# Patient Record
Sex: MALE | NOT HISPANIC OR LATINO
[De-identification: names, ages, dates, MRNs, and addresses within clinical notes are randomized per-mention and may not be internally consistent; named-entity substitution may affect disease eponyms.]

---

## 2019-10-10 ENCOUNTER — APPOINTMENT (OUTPATIENT)
Dept: ORTHOPEDIC SURGERY | Facility: CLINIC | Age: 54
End: 2019-10-10
Payer: COMMERCIAL

## 2019-10-10 VITALS — HEART RATE: 70 BPM | SYSTOLIC BLOOD PRESSURE: 170 MMHG | DIASTOLIC BLOOD PRESSURE: 103 MMHG

## 2019-10-10 PROCEDURE — 99203 OFFICE O/P NEW LOW 30 MIN: CPT

## 2019-10-10 PROCEDURE — 73630 X-RAY EXAM OF FOOT: CPT | Mod: LT

## 2019-10-17 ENCOUNTER — EMERGENCY (EMERGENCY)
Facility: HOSPITAL | Age: 54
LOS: 0 days | Discharge: HOME | End: 2019-10-17
Attending: EMERGENCY MEDICINE
Payer: COMMERCIAL

## 2019-10-17 ENCOUNTER — APPOINTMENT (OUTPATIENT)
Dept: INTERNAL MEDICINE | Facility: CLINIC | Age: 54
End: 2019-10-17
Payer: COMMERCIAL

## 2019-10-17 ENCOUNTER — APPOINTMENT (OUTPATIENT)
Dept: ORTHOPEDIC SURGERY | Facility: CLINIC | Age: 54
End: 2019-10-17
Payer: COMMERCIAL

## 2019-10-17 VITALS
OXYGEN SATURATION: 98 % | BODY MASS INDEX: 35.55 KG/M2 | TEMPERATURE: 98.3 F | RESPIRATION RATE: 18 BRPM | HEIGHT: 69 IN | WEIGHT: 240 LBS | SYSTOLIC BLOOD PRESSURE: 180 MMHG | HEART RATE: 86 BPM | DIASTOLIC BLOOD PRESSURE: 100 MMHG

## 2019-10-17 VITALS
WEIGHT: 195.11 LBS | HEART RATE: 86 BPM | SYSTOLIC BLOOD PRESSURE: 173 MMHG | OXYGEN SATURATION: 98 % | TEMPERATURE: 99 F | DIASTOLIC BLOOD PRESSURE: 102 MMHG | RESPIRATION RATE: 16 BRPM

## 2019-10-17 VITALS
HEART RATE: 75 BPM | RESPIRATION RATE: 16 BRPM | SYSTOLIC BLOOD PRESSURE: 152 MMHG | DIASTOLIC BLOOD PRESSURE: 86 MMHG | OXYGEN SATURATION: 97 %

## 2019-10-17 VITALS — TEMPERATURE: 98.6 F | SYSTOLIC BLOOD PRESSURE: 208 MMHG | DIASTOLIC BLOOD PRESSURE: 100 MMHG

## 2019-10-17 DIAGNOSIS — Z72.3 LACK OF PHYSICAL EXERCISE: ICD-10-CM

## 2019-10-17 DIAGNOSIS — Z78.9 OTHER SPECIFIED HEALTH STATUS: ICD-10-CM

## 2019-10-17 DIAGNOSIS — I10 ESSENTIAL (PRIMARY) HYPERTENSION: ICD-10-CM

## 2019-10-17 DIAGNOSIS — R07.9 CHEST PAIN, UNSPECIFIED: ICD-10-CM

## 2019-10-17 DIAGNOSIS — Z80.42 FAMILY HISTORY OF MALIGNANT NEOPLASM OF PROSTATE: ICD-10-CM

## 2019-10-17 DIAGNOSIS — R07.89 OTHER CHEST PAIN: ICD-10-CM

## 2019-10-17 DIAGNOSIS — Z80.8 FAMILY HISTORY OF MALIGNANT NEOPLASM OF OTHER ORGANS OR SYSTEMS: ICD-10-CM

## 2019-10-17 LAB
ALBUMIN SERPL ELPH-MCNC: 3.9 G/DL — SIGNIFICANT CHANGE UP (ref 3.3–5)
ALP SERPL-CCNC: 60 U/L — SIGNIFICANT CHANGE UP (ref 40–120)
ALT FLD-CCNC: 32 U/L — SIGNIFICANT CHANGE UP (ref 12–78)
ANION GAP SERPL CALC-SCNC: 7 MMOL/L — SIGNIFICANT CHANGE UP (ref 5–17)
APTT BLD: 31.9 SEC — SIGNIFICANT CHANGE UP (ref 27.5–36.3)
AST SERPL-CCNC: 20 U/L — SIGNIFICANT CHANGE UP (ref 15–37)
BASOPHILS # BLD AUTO: 0.04 K/UL — SIGNIFICANT CHANGE UP (ref 0–0.2)
BASOPHILS NFR BLD AUTO: 0.4 % — SIGNIFICANT CHANGE UP (ref 0–2)
BILIRUB SERPL-MCNC: 0.5 MG/DL — SIGNIFICANT CHANGE UP (ref 0.2–1.2)
BUN SERPL-MCNC: 15 MG/DL — SIGNIFICANT CHANGE UP (ref 7–23)
CALCIUM SERPL-MCNC: 9.1 MG/DL — SIGNIFICANT CHANGE UP (ref 8.5–10.1)
CHLORIDE SERPL-SCNC: 108 MMOL/L — SIGNIFICANT CHANGE UP (ref 96–108)
CO2 SERPL-SCNC: 27 MMOL/L — SIGNIFICANT CHANGE UP (ref 22–31)
CREAT SERPL-MCNC: 1.05 MG/DL — SIGNIFICANT CHANGE UP (ref 0.5–1.3)
EOSINOPHIL # BLD AUTO: 0.23 K/UL — SIGNIFICANT CHANGE UP (ref 0–0.5)
EOSINOPHIL NFR BLD AUTO: 2.2 % — SIGNIFICANT CHANGE UP (ref 0–6)
GLUCOSE SERPL-MCNC: 105 MG/DL — HIGH (ref 70–99)
HCT VFR BLD CALC: 45.3 % — SIGNIFICANT CHANGE UP (ref 39–50)
HGB BLD-MCNC: 15.7 G/DL — SIGNIFICANT CHANGE UP (ref 13–17)
IMM GRANULOCYTES NFR BLD AUTO: 0.3 % — SIGNIFICANT CHANGE UP (ref 0–1.5)
INR BLD: 1.09 RATIO — SIGNIFICANT CHANGE UP (ref 0.88–1.16)
LYMPHOCYTES # BLD AUTO: 2.3 K/UL — SIGNIFICANT CHANGE UP (ref 1–3.3)
LYMPHOCYTES # BLD AUTO: 21.6 % — SIGNIFICANT CHANGE UP (ref 13–44)
MCHC RBC-ENTMCNC: 30.3 PG — SIGNIFICANT CHANGE UP (ref 27–34)
MCHC RBC-ENTMCNC: 34.7 GM/DL — SIGNIFICANT CHANGE UP (ref 32–36)
MCV RBC AUTO: 87.3 FL — SIGNIFICANT CHANGE UP (ref 80–100)
MONOCYTES # BLD AUTO: 0.6 K/UL — SIGNIFICANT CHANGE UP (ref 0–0.9)
MONOCYTES NFR BLD AUTO: 5.6 % — SIGNIFICANT CHANGE UP (ref 2–14)
NEUTROPHILS # BLD AUTO: 7.43 K/UL — HIGH (ref 1.8–7.4)
NEUTROPHILS NFR BLD AUTO: 69.9 % — SIGNIFICANT CHANGE UP (ref 43–77)
PLATELET # BLD AUTO: 188 K/UL — SIGNIFICANT CHANGE UP (ref 150–400)
POTASSIUM SERPL-MCNC: 4.1 MMOL/L — SIGNIFICANT CHANGE UP (ref 3.5–5.3)
POTASSIUM SERPL-SCNC: 4.1 MMOL/L — SIGNIFICANT CHANGE UP (ref 3.5–5.3)
PROT SERPL-MCNC: 7.5 GM/DL — SIGNIFICANT CHANGE UP (ref 6–8.3)
PROTHROM AB SERPL-ACNC: 12.1 SEC — SIGNIFICANT CHANGE UP (ref 10–12.9)
RBC # BLD: 5.19 M/UL — SIGNIFICANT CHANGE UP (ref 4.2–5.8)
RBC # FLD: 11.8 % — SIGNIFICANT CHANGE UP (ref 10.3–14.5)
SODIUM SERPL-SCNC: 142 MMOL/L — SIGNIFICANT CHANGE UP (ref 135–145)
TROPONIN I SERPL-MCNC: <0.015 NG/ML — SIGNIFICANT CHANGE UP (ref 0.01–0.04)
WBC # BLD: 10.63 K/UL — HIGH (ref 3.8–10.5)
WBC # FLD AUTO: 10.63 K/UL — HIGH (ref 3.8–10.5)

## 2019-10-17 PROCEDURE — 99214 OFFICE O/P EST MOD 30 MIN: CPT | Mod: 57

## 2019-10-17 PROCEDURE — 36415 COLL VENOUS BLD VENIPUNCTURE: CPT

## 2019-10-17 PROCEDURE — 85025 COMPLETE CBC W/AUTO DIFF WBC: CPT

## 2019-10-17 PROCEDURE — 93010 ELECTROCARDIOGRAM REPORT: CPT

## 2019-10-17 PROCEDURE — 99284 EMERGENCY DEPT VISIT MOD MDM: CPT | Mod: 25

## 2019-10-17 PROCEDURE — 99285 EMERGENCY DEPT VISIT HI MDM: CPT

## 2019-10-17 PROCEDURE — 28470 CLTX METATARSAL FX WO MNP EA: CPT | Mod: LT

## 2019-10-17 PROCEDURE — 85730 THROMBOPLASTIN TIME PARTIAL: CPT

## 2019-10-17 PROCEDURE — 93005 ELECTROCARDIOGRAM TRACING: CPT

## 2019-10-17 PROCEDURE — 85610 PROTHROMBIN TIME: CPT

## 2019-10-17 PROCEDURE — 71045 X-RAY EXAM CHEST 1 VIEW: CPT

## 2019-10-17 PROCEDURE — 71045 X-RAY EXAM CHEST 1 VIEW: CPT | Mod: 26

## 2019-10-17 PROCEDURE — 73620 X-RAY EXAM OF FOOT: CPT | Mod: LT

## 2019-10-17 PROCEDURE — 99205 OFFICE O/P NEW HI 60 MIN: CPT

## 2019-10-17 PROCEDURE — 80053 COMPREHEN METABOLIC PANEL: CPT

## 2019-10-17 PROCEDURE — 84484 ASSAY OF TROPONIN QUANT: CPT

## 2019-10-17 RX ORDER — MULTIVIT-MIN/FOLIC/VIT K/LYCOP 400-300MCG
TABLET ORAL
Refills: 0 | Status: ACTIVE | COMMUNITY

## 2019-10-17 RX ORDER — METOPROLOL TARTRATE 50 MG
50 TABLET ORAL ONCE
Refills: 0 | Status: COMPLETED | OUTPATIENT
Start: 2019-10-17 | End: 2019-10-17

## 2019-10-17 RX ORDER — METOPROLOL TARTRATE 50 MG
1 TABLET ORAL
Qty: 30 | Refills: 0
Start: 2019-10-17 | End: 2019-11-15

## 2019-10-17 RX ADMIN — Medication 50 MILLIGRAM(S): at 17:20

## 2019-10-17 NOTE — CONSULT NOTE ADULT - PROBLEM SELECTOR RECOMMENDATION 9
Atypical symptoms for angina; recommend outpatient nuclear stress test if serial assays of serum troponin are normal; ECG has a nonischemic appearance.

## 2019-10-17 NOTE — ED PROVIDER NOTE - CARE PROVIDERS DIRECT ADDRESSES
[email or web address]

## 2019-10-17 NOTE — ED ADULT NURSE NOTE - CHPI ED NUR SYMPTOMS NEG
no back pain/no nausea/no congestion/no fever/no dizziness/no syncope/no shortness of breath/no vomiting/no diaphoresis/no chills

## 2019-10-17 NOTE — HISTORY OF PRESENT ILLNESS
[FreeTextEntry8] : Mr. Partida is a 53-year-old male who presents for acute evaluation. He was referred from Dr. James Mcpherson's office for evaluation of hypertension. Mr. Partida was found to have a blood pressure of 220/100 in Dr. Mcpherson's office. He was being evaluated for a left foot injury. Apparently, the patient was at a podiatrist's office last week and was found to have a blood pressure at that time of 170/90 but was not advised to have any medical followup. While in the waiting room in this office the patient states that he had approximately 10 minutes of midsternal discomfort with radiation to left arm. Has no previous history of coronary artery disease or chest pain. Blood pressure is 180/100. Mr. Partida does have a history of hypertension but last saw a primary care doctor 5 years ago and has not been on antihypertensive medication. He denies any auditory or visual disturbances.

## 2019-10-17 NOTE — CONSULT NOTE ADULT - SUBJECTIVE AND OBJECTIVE BOX
CHIEF COMPLAINT: Patient is a 53y old  Male who presents with a chief complaint of HTN and chest discomfort     HPI: Mark Partida is a 53 year old man with a history of HTN who was sent to the ED by Dr. Monsivais for the evaluation of chest discomfort and hypertension.  Mr Partida has a history of HTN but discontinued antihypertensive therapy ~ 5 years ago; he has not had any medical attention in ~ 5 years but previously had been seeing Dr. Geoffrey Odell in [place].  He has no history of heart disease.  His BP was found to be markedly elevated on recent visits to his podiatrist and orthopedic surgeon.  He denies headache, dyspnea; describes infrequent and mild chest discomfort that occurs sporadically and not associated with exertion - he is unable to identify exacerbating or alleviating factors.  He works at [employer] and walks ~ 20,000 steps/day with no baseline angina.  He admits to a suboptimal diet.  He presently feels well but describes anxiety.    PAST MEDICAL & SURGICAL HISTORY:  As above in HPI  Recent metatarsal fracture    SOCIAL HISTORY:   Alcohol: Rare consumption  Smoking: Nonsmoker    FAMILY HISTORY: Father with CAD and s/p CABG (not premature); multiple family members with HTN    HOME MEDICATIONS: None    Allergies:  No Known Allergies    REVIEW OF SYSTEMS:  CONSTITUTIONAL: No weakness, fevers or chills  Eyes: No visual changes  NECK: No pain or stiffness  RESPIRATORY: No cough, wheezing, hemoptysis; No shortness of breath  CARDIOVASCULAR: + chest pain (SEE HPI); no palpitations  GASTROINTESTINAL: No abdominal pain. No nausea, vomiting, or hematemesis; No diarrhea or constipation. No melena or hematochezia.  GENITOURINARY: No dysuria, frequency or hematuria  NEUROLOGICAL: No numbness.  PSYCH: + Anxiety  SKIN: No itching or rash  All other review of systems is negative unless indicated above    VITAL SIGNS:   Vital Signs Last 24 Hrs  T(C): 37.1 (17 Oct 2019 14:15), Max: 37.1 (17 Oct 2019 14:15)  T(F): 98.7 (17 Oct 2019 14:15), Max: 98.7 (17 Oct 2019 14:15)  HR: 84 (17 Oct 2019 15:33) (84 - 86)  BP: 155/93 (17 Oct 2019 15:33) (155/93 - 173/102)  RR: 16 (17 Oct 2019 15:33) (16 - 16)  SpO2: 97% (17 Oct 2019 15:33) (97% - 98%)    PHYSICAL EXAM:  Constitutional: NAD, awake and alert, overweight  HEENT:  EOMI,  Pupils round, No oral cyanosis.  Pulmonary: Non-labored, breath sounds are clear bilaterally, No wheezing, rales or rhonchi  Cardiovascular: S1 and S2, regular rate and rhythm, no Murmur  Gastrointestinal: Bowel Sounds present, soft, nontender.   Lymph: No peripheral edema. No cervical lymphadenopathy.  Neurological: Alert, no focal deficits  Skin: No rashes.  Psych:  Mood & affect appropriate    LABS:                     15.7   10.63 )-----------( 188      ( 17 Oct 2019 14:18 )             45.3     142    |  108    |  15     ----------------------------<  105    4.1     |  27     |  1.05     ECG: Normal sinus rhythm, LVH

## 2019-10-17 NOTE — ED ADULT NURSE NOTE - OBJECTIVE STATEMENT
pt sent from ortho dr for htn and angina. pt was getting his left foot re checked after toe fracture and bp was elevated and stated he had some slight midline chest pain that radiated to left shoulder but unsure if muscle or not. pt was given 162 of aspirin and IV placed LAC 20G by ems. pt currently denies any chest pain or sob. bp 170s currently. attached to cardiac monitor and ekg completed given to dr flores.

## 2019-10-17 NOTE — ED PROVIDER NOTE - CARE PROVIDER_API CALL
Asad See)  Cardiovascular Disease; Internal Medicine  [address] [place] [address]  Phone: [phone]  Fax: [phone]  Follow Up Time:

## 2019-10-17 NOTE — PHYSICAL EXAM
[No Acute Distress] : no acute distress [Well Nourished] : well nourished [Well-Appearing] : well-appearing [Normal Sclera/Conjunctiva] : normal sclera/conjunctiva [Well Developed] : well developed [PERRL] : pupils equal round and reactive to light [EOMI] : extraocular movements intact [Normal Oropharynx] : the oropharynx was normal [Normal Outer Ear/Nose] : the outer ears and nose were normal in appearance [No Lymphadenopathy] : no lymphadenopathy [No JVD] : no jugular venous distention [Supple] : supple [Thyroid Normal, No Nodules] : the thyroid was normal and there were no nodules present [No Respiratory Distress] : no respiratory distress  [No Accessory Muscle Use] : no accessory muscle use [Normal Rate] : normal rate  [Clear to Auscultation] : lungs were clear to auscultation bilaterally [Regular Rhythm] : with a regular rhythm [Normal S1, S2] : normal S1 and S2 [No Carotid Bruits] : no carotid bruits [No Murmur] : no murmur heard [No Abdominal Bruit] : a ~M bruit was not heard ~T in the abdomen [No Varicosities] : no varicosities [No Palpable Aorta] : no palpable aorta [No Edema] : there was no peripheral edema [Pedal Pulses Present] : the pedal pulses are present [No Extremity Clubbing/Cyanosis] : no extremity clubbing/cyanosis [Non Tender] : non-tender [Non-distended] : non-distended [Soft] : abdomen soft [No HSM] : no HSM [No Masses] : no abdominal mass palpated [Normal Anterior Cervical Nodes] : no anterior cervical lymphadenopathy [Normal Posterior Cervical Nodes] : no posterior cervical lymphadenopathy [Normal Bowel Sounds] : normal bowel sounds [No CVA Tenderness] : no CVA  tenderness [No Spinal Tenderness] : no spinal tenderness [Grossly Normal Strength/Tone] : grossly normal strength/tone [No Joint Swelling] : no joint swelling [No Rash] : no rash [Coordination Grossly Intact] : coordination grossly intact [No Focal Deficits] : no focal deficits [Deep Tendon Reflexes (DTR)] : deep tendon reflexes were 2+ and symmetric [Normal Gait] : normal gait [Normal Affect] : the affect was normal [Normal Insight/Judgement] : insight and judgment were intact
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
## 2019-10-17 NOTE — ED PROVIDER NOTE - PATIENT PORTAL LINK FT
You can access the FollowMyHealth Patient Portal offered by [facility] by registering at the following website: [email or web address]. By joining [facility]’s FollowMyHealth portal, you will also be able to view your health information using other applications (apps) compatible with our system.

## 2019-10-17 NOTE — PLAN
[FreeTextEntry1] : Mr. Partida is a 53-year-old male who presents for acute evaluation. He has accelerated hypertension with a blood pressure of 180/100. Had previously been 210/100 earlier today. The patient had an episode of substernal chest discomfort lasting approximately 10 minutes with some radiation into left arm. He has no previous history of coronary disease. The patient will be sent on to the hospital emergency room via ambulance for further evaluation and treatment. I have notified Dr. Asad See full cardiology evaluation.

## 2019-10-17 NOTE — CONSULT NOTE ADULT - PROBLEM SELECTOR RECOMMENDATION 2
Uncontrolled / untreated (due to nonadherence); poor diet and anxiety likely contributing; would start metoprolol succinate 50mg daily; I offered to see patient in my [place] office next week to titrate Rx and reassess symptoms / schedule appropriate testing.

## 2019-10-17 NOTE — ED ADULT TRIAGE NOTE - CHIEF COMPLAINT QUOTE
pt presents to ED due to HTN pt was seen at his Ortho MD when he was found with HTN and sent to his PMD he developed chest pain while waiting to see PMD pt was given  mg PO by EMS and brought to ED

## 2019-10-17 NOTE — ED PROVIDER NOTE - EKG #1 DATE/TIME
How Severe Is Your Skin Lesion?: mild Has Your Skin Lesion Been Treated?: been treated Is This A New Presentation, Or A Follow-Up?: Skin Lesion 17-Oct-2019 14:27

## 2019-10-17 NOTE — ED ADULT NURSE REASSESSMENT NOTE - NS ED NURSE REASSESS COMMENT FT1
pt dc to home, dc instructions given, pt verbalized understanding of dc. in no acute distress. denies chest pain/sob.

## 2019-10-22 PROBLEM — Z78.9 OTHER SPECIFIED HEALTH STATUS: Chronic | Status: ACTIVE | Noted: 2019-10-17

## 2019-10-23 ENCOUNTER — APPOINTMENT (OUTPATIENT)
Dept: CARDIOLOGY | Facility: CLINIC | Age: 54
End: 2019-10-23
Payer: COMMERCIAL

## 2019-10-23 ENCOUNTER — NON-APPOINTMENT (OUTPATIENT)
Age: 54
End: 2019-10-23

## 2019-10-23 VITALS
HEART RATE: 95 BPM | DIASTOLIC BLOOD PRESSURE: 107 MMHG | RESPIRATION RATE: 16 BRPM | OXYGEN SATURATION: 96 % | BODY MASS INDEX: 35.55 KG/M2 | SYSTOLIC BLOOD PRESSURE: 169 MMHG | WEIGHT: 240 LBS | HEIGHT: 69 IN

## 2019-10-23 VITALS — SYSTOLIC BLOOD PRESSURE: 144 MMHG | DIASTOLIC BLOOD PRESSURE: 84 MMHG

## 2019-10-23 DIAGNOSIS — Z78.9 OTHER SPECIFIED HEALTH STATUS: ICD-10-CM

## 2019-10-23 PROCEDURE — 99215 OFFICE O/P EST HI 40 MIN: CPT

## 2019-10-23 PROCEDURE — 93000 ELECTROCARDIOGRAM COMPLETE: CPT

## 2019-10-23 NOTE — PHYSICAL EXAM
[General Appearance - In No Acute Distress] : no acute distress [Well Groomed] : well groomed [Eyelids - No Xanthelasma] : the eyelids demonstrated no xanthelasmas [No Oral Cyanosis] : no oral cyanosis [Respiration, Rhythm And Depth] : normal respiratory rhythm and effort [Heart Rate And Rhythm] : heart rate and rhythm were normal [Heart Sounds] : normal S1 and S2 [Abdomen Soft] : soft [Bowel Sounds] : normal bowel sounds [Nail Clubbing] : no clubbing of the fingernails [] : no rash [Cyanosis, Localized] : no localized cyanosis [Impaired Insight] : insight and judgment were intact [Oriented To Time, Place, And Person] : oriented to person, place, and time [Mood] : the mood was normal [Affect] : the affect was normal [FreeTextEntry1] : Gait abnormal due to boot on left foot (metatarsal fracture)

## 2019-10-23 NOTE — REVIEW OF SYSTEMS
[see HPI] : see HPI [Chest Pain] : chest pain [Foot Problem] : foot problems [Foot Pain] : foot pain [Negative] : Endocrine [Headache] : no headache [Shortness Of Breath] : no shortness of breath [Palpitations] : no palpitations

## 2019-10-23 NOTE — REASON FOR VISIT
[Follow-Up - From Hospitalization] : follow-up of a recent hospitalization for [Abnormal ECG] : an abnormal ECG [Chest Pain] : chest pain [Hypertension] : hypertension [Medication Management] : Medication management [Other: _____] : [unfilled]

## 2019-10-23 NOTE — HISTORY OF PRESENT ILLNESS
[FreeTextEntry1] : Mark Partida is a 53 year old man with a history of HTN who I met in the [facility] Emergency Department on 10/17/19 after he was referred there by Dr. Monsivais for the evaluation of chest discomfort and hypertension.  Mr Partida has a history of HTN but discontinued antihypertensive therapy ~ 5 years ago; he has not had any medical attention in ~ 5 years but previously had been seeing Dr. Geoffrey Odell in [place].  He has no history of heart disease.  In the ED his BP was moderately elevated and serial assays of troponin were found to be normal -- he was prescribed metoprolol succinate and released after a period of observation.  He has been tolerating metoprolol and denies a recurrence of chest discomfort.  However, he previously experienced mild to moderate substernal chest discomfort radiating towards his left shoulder and arm -- unable to identify exacerbating or alleviating factors.\par

## 2019-10-23 NOTE — DISCUSSION/SUMMARY
[___ Month(s)] : [unfilled] month(s) [With Me] : with me [FreeTextEntry1] : \par Accelerated hypertension: Blood pressure has responded well to initiation of metoprolol succinate ER 50 mg daily; we discussed the importance of a concomitant lifestyle modification (reduction of dietary intake of sodium, weight loss).\par \par Chest pain: Mr. Partida described atypical angina and has several risk factors for heart disease (including hypertension [untreated for the past 5+ years], obesity, and family history of coronary artery disease); his present left foot (metatarsal) fracture precludes exercise stress -- I recommended that he return for pharmacologic stress testing with myocardial perfusion imaging.\par \par Abnormal ECG: Electrocardiogram is consistent with LVH, likely related to previously untreated hypertension; I recommend echocardiography to assess cardiac size and function.\par \par Obesity: We discussed the importance of weight loss; specifically diet, calorie and sodium restriction; exercise when feasible from an orthopedic standpoint.

## 2019-10-29 NOTE — HISTORY OF PRESENT ILLNESS
[de-identified] : The patient comes in today for his left foot.  He states he is not sure exactly how he injured himself.  He was seen at an urgent care center.  He had x-rays taken and comes here for evaluation.  The patient states the pain is constant.   [] : No

## 2019-10-29 NOTE — ADDENDUM
[FreeTextEntry1] : This note was written by Tonja Batista on 10/28/2019 acting as a scribe for NICHOLAS AVILES

## 2019-10-29 NOTE — DISCUSSION/SUMMARY
From: Jony Ochoa  To: Saul Akers MD  Sent: 7/1/2017 11:10 AM CDT  Subject: Atorvastatin Prescription    Dr. Oswald North.    I have 18 atorvastatin pills remaining. I will run out a few days before my scheduled appointment on 7/21. I have no refills left.    Would you renew my prescription before my appointment, or should I try to reschedule the appointment for an earlier date.    Regards,  + Jony   [de-identified] : At this time, due to a fracture of the proximal left fifth metatarsal, I recommended a CAM boot, weightbearing as tolerated.  He will be reassessed in two weeks.  Of note, because of his elevated blood pressure, I will refer him to his primary care.  \par \par I declare responsibility and liability for caring for this fracture for the next 90 days.\par \par \par

## 2019-10-29 NOTE — PHYSICAL EXAM
[de-identified] : Right Foot: \par Range of Motion in Degrees:\par 	                                Claimant:	                Normal:	\par Dorsiflexion (Active)	40-degrees	40-degrees	\par Dorsiflexion (Passive)	40-degrees	40-degrees	\par Plantar (Ankle)	                40-degrees	40-degrees	\par Plantar (Passive)	                40-degrees	40-degrees	\par Inversion (Active)	                30-degrees	30-degrees	\par Inversion (Passive)   	30-degrees	30-degrees	\par Eversion  (Active)	                20-degrees	20-degrees	\par Eversion (Passive) 	                20-degrees	20-degrees\par 	\par No instability.  No tenderness over the anterolateral ligaments.  No medial-sided tenderness.  No hind, mid or forefoot tenderness.  No proximal fibula tenderness.  Neurologically and vascularly intact distally.  Normal dorsi, plantar flexion, inversion of the foot.  No motor, sensory, reflex or vascular deficits.  2+ DP and PT pulses.  Skin is intact.  No rashes, scars or lesions.\par \par Left Foot:\par Tender over the base of the fifth metatarsal.  Range of motion is limited secondary to fracture.\par   [de-identified] : Ambulating with a slightly antalgic to antalgic gait.  Station:  Normal.  [de-identified] : Appearance:  Well-developed, well-nourished male in no acute distress.\par \par \par  [de-identified] : Radiographs, two views of the left foot, show a proximal fifth metatarsal fracture.

## 2019-10-29 NOTE — CONSULT LETTER
[Dear  ___] : Dear  [unfilled], [Referral Letter:] : I am referring [unfilled] to you for further evaluation.  My most recent evaluation follows. [Referral Closing:] : Thank you very much for seeing this patient.  If you have any questions, please do not hesitate to contact me. [Sincerely,] : Sincerely, [FreeTextEntry3] : James Mcpherson III, MD\par [facility]acy

## 2019-10-30 ENCOUNTER — APPOINTMENT (OUTPATIENT)
Dept: INTERNAL MEDICINE | Facility: CLINIC | Age: 54
End: 2019-10-30
Payer: COMMERCIAL

## 2019-10-30 VITALS
DIASTOLIC BLOOD PRESSURE: 102 MMHG | HEART RATE: 64 BPM | SYSTOLIC BLOOD PRESSURE: 152 MMHG | WEIGHT: 24 LBS | TEMPERATURE: 99.1 F | OXYGEN SATURATION: 97 % | HEIGHT: 70 IN | BODY MASS INDEX: 3.44 KG/M2 | RESPIRATION RATE: 18 BRPM

## 2019-10-30 DIAGNOSIS — Z87.898 PERSONAL HISTORY OF OTHER SPECIFIED CONDITIONS: ICD-10-CM

## 2019-10-30 PROCEDURE — 99214 OFFICE O/P EST MOD 30 MIN: CPT

## 2019-10-30 NOTE — HISTORY OF PRESENT ILLNESS
[FreeTextEntry1] : followup [de-identified] : Mr. Partida presents for followup evaluation. He has history of accelerated hypertension. He is currently on metoprolol succinate 50 mg daily. He has not taken today's dose yet. Patient did have a cardiology evaluation by . The stool denies any chest pain, shortness of breath or palpitations. He does have a metatarsal fracture on the left foot and has his foot in any boot. He is being followed by Dr. James Mcpherson.

## 2019-10-30 NOTE — PLAN
[FreeTextEntry1] : Mr. Partida presents for followup evaluation. He is been given a prescription for comprehensive blood profile. He's been instructed to take an extra dose of his metoprolol 50 mg daily today. He will continue cardiology followup with  and orthopedic followup with Dr. Mcpherson. Patient has been referred to Dr. Hicks in for screening colonoscopy. He has been given a kit for fecal occult blood testing. Patient will also schedule an appointment for the flu vaccine and a tetanus if pertussis vaccine. Followup in 6 months.

## 2019-10-31 ENCOUNTER — APPOINTMENT (OUTPATIENT)
Dept: ORTHOPEDIC SURGERY | Facility: CLINIC | Age: 54
End: 2019-10-31
Payer: COMMERCIAL

## 2019-10-31 VITALS
SYSTOLIC BLOOD PRESSURE: 164 MMHG | WEIGHT: 240 LBS | TEMPERATURE: 98.3 F | DIASTOLIC BLOOD PRESSURE: 89 MMHG | HEART RATE: 65 BPM | BODY MASS INDEX: 34.36 KG/M2 | HEIGHT: 70 IN

## 2019-10-31 PROCEDURE — 99024 POSTOP FOLLOW-UP VISIT: CPT

## 2019-10-31 PROCEDURE — 73630 X-RAY EXAM OF FOOT: CPT | Mod: LT

## 2019-11-06 ENCOUNTER — APPOINTMENT (OUTPATIENT)
Dept: INTERNAL MEDICINE | Facility: CLINIC | Age: 54
End: 2019-11-06
Payer: COMMERCIAL

## 2019-11-06 PROCEDURE — 90686 IIV4 VACC NO PRSV 0.5 ML IM: CPT

## 2019-11-06 PROCEDURE — G0008: CPT

## 2019-11-06 PROCEDURE — 90472 IMMUNIZATION ADMIN EACH ADD: CPT

## 2019-11-06 PROCEDURE — 90715 TDAP VACCINE 7 YRS/> IM: CPT

## 2019-11-12 NOTE — PHYSICAL EXAM
[de-identified] : Radiographs, three views of the left foot, show evidence of healing. [de-identified] : Left Foot:\par Range of motion limited secondary to pain.  Mildly tender at the base of the fifth metatarsal.

## 2019-11-12 NOTE — DISCUSSION/SUMMARY
[de-identified] : At this time, due to fracture of the fifth metatarsal of the left foot, I recommend the patient continue his current modalities.  I will allow him to return to work light duty with his boot.  He will be reassessed in three weeks.\par \par The patient has been advised that their blood pressure today was outside normal ranges and the patient was instructed accordingly. Our Blood Pressure Monitoring Sheet with instructions was given to the patient.\par

## 2019-11-12 NOTE — ADDENDUM
[FreeTextEntry1] : This note was written by Jud Villatoro on 11/05/2019 acting as scribe for James Mcpherson III, MD

## 2019-11-14 ENCOUNTER — APPOINTMENT (OUTPATIENT)
Dept: CARDIOLOGY | Facility: CLINIC | Age: 54
End: 2019-11-14
Payer: COMMERCIAL

## 2019-11-14 PROCEDURE — 93306 TTE W/DOPPLER COMPLETE: CPT

## 2019-11-19 ENCOUNTER — APPOINTMENT (OUTPATIENT)
Dept: CARDIOLOGY | Facility: CLINIC | Age: 54
End: 2019-11-19
Payer: COMMERCIAL

## 2019-11-19 PROCEDURE — 78452 HT MUSCLE IMAGE SPECT MULT: CPT

## 2019-11-19 PROCEDURE — 93015 CV STRESS TEST SUPVJ I&R: CPT

## 2019-11-19 PROCEDURE — A9500: CPT

## 2019-11-21 ENCOUNTER — APPOINTMENT (OUTPATIENT)
Dept: ORTHOPEDIC SURGERY | Facility: CLINIC | Age: 54
End: 2019-11-21
Payer: COMMERCIAL

## 2019-11-21 VITALS
HEART RATE: 64 BPM | HEIGHT: 69 IN | WEIGHT: 240 LBS | SYSTOLIC BLOOD PRESSURE: 165 MMHG | BODY MASS INDEX: 35.55 KG/M2 | TEMPERATURE: 98.2 F | DIASTOLIC BLOOD PRESSURE: 91 MMHG

## 2019-11-21 PROCEDURE — 73620 X-RAY EXAM OF FOOT: CPT | Mod: LT

## 2019-11-21 PROCEDURE — 99024 POSTOP FOLLOW-UP VISIT: CPT

## 2019-11-26 NOTE — DISCUSSION/SUMMARY
[de-identified] : The patient is doing well status post fracture left fifth metatarsal.  At this time I am going to wean him off his boot, progress to weightbearing as tolerated.  He will be reassessed in two weeks should his symptoms warrant.

## 2019-11-26 NOTE — PHYSICAL EXAM
[de-identified] : Left foot:\par Foot: Range of Motion in Degrees:\par 	                                Claimant:	                Normal:	\par Dorsiflexion (Active)	40-degrees	40-degrees	\par Dorsiflexion (Passive)	40-degrees	40-degrees	\par Plantar (Ankle)	                40-degrees	40-degrees	\par Plantar (Passive)	                40-degrees	40-degrees	\par Inversion (Active)	                30-degrees	30-degrees	\par Inversion (Passive)   	30-degrees	30-degrees	\par Eversion  (Active)	                20-degrees	20-degrees	\par Eversion (Passive) 	                20-degrees	20-degrees	\par \par He is minimally to non-tender over the base of the fifth metatarsal.  [de-identified] : Radiographs, three views of the left foot, show excellent position and healing.

## 2019-11-26 NOTE — ADDENDUM
[FreeTextEntry1] : This note was written by Shelbie Dixon on 11/25/2019 acting as scribe for James Mcpherson III, MD

## 2019-12-18 ENCOUNTER — APPOINTMENT (OUTPATIENT)
Dept: CARDIOLOGY | Facility: CLINIC | Age: 54
End: 2019-12-18
Payer: COMMERCIAL

## 2019-12-18 VITALS — SYSTOLIC BLOOD PRESSURE: 146 MMHG | DIASTOLIC BLOOD PRESSURE: 82 MMHG

## 2019-12-18 VITALS
BODY MASS INDEX: 35.55 KG/M2 | SYSTOLIC BLOOD PRESSURE: 154 MMHG | HEART RATE: 55 BPM | WEIGHT: 240 LBS | HEIGHT: 69 IN | OXYGEN SATURATION: 98 % | DIASTOLIC BLOOD PRESSURE: 92 MMHG

## 2019-12-18 DIAGNOSIS — Z86.79 PERSONAL HISTORY OF OTHER DISEASES OF THE CIRCULATORY SYSTEM: ICD-10-CM

## 2019-12-18 PROCEDURE — 99214 OFFICE O/P EST MOD 30 MIN: CPT

## 2019-12-18 NOTE — REVIEW OF SYSTEMS
[see HPI] : see HPI [Foot Problem] : foot problems [Foot Pain] : foot pain [Negative] : Heme/Lymph [Headache] : no headache [Feeling Fatigued] : not feeling fatigued [Shortness Of Breath] : no shortness of breath [Chest  Pressure] : no chest pressure [Dyspnea on exertion] : not dyspnea during exertion [Chest Pain] : no chest pain [Palpitations] : no palpitations [Under Stress] : under stress

## 2019-12-18 NOTE — HISTORY OF PRESENT ILLNESS
[FreeTextEntry1] : Mark Partida is a 54 year old man with a history of HTN who I met in the [facility] Emergency Department on 10/17/19 after he was referred there by Dr. Monsivias for the evaluation of chest discomfort and hypertension.   His hypertension responded well to the reinitiation of antihypertensive therapy (following a five-year hiatus from medical attention).  His chest discomfort was atypical for angina and subsequent ischemia evaluation revealed the presence of normal myocardial perfusion.  He has been feeling well since our last encounter and offers no new complaints.  Chest discomfort has not recurred.

## 2019-12-18 NOTE — DISCUSSION/SUMMARY
[___ Month(s)] : [unfilled] month(s) [With Me] : with me [FreeTextEntry1] : \par Hypertension: Suboptimal control - but improved; continue metoprolol and add losartan 50 mg daily.\par \par Chest pain: Atypical anginal symptoms have resolved; recent ischemia evaluation demonstrated normal myocardial perfusion on SPECT imaging.\par

## 2019-12-18 NOTE — REASON FOR VISIT
[Follow-Up - Clinic] : a clinic follow-up of [Abnormal ECG] : an abnormal ECG [Chest Pain] : chest pain [Hypertension] : hypertension [Medication Management] : Medication management

## 2019-12-18 NOTE — PHYSICAL EXAM
[Well Groomed] : well groomed [General Appearance - In No Acute Distress] : no acute distress [No Oral Cyanosis] : no oral cyanosis [Respiration, Rhythm And Depth] : normal respiratory rhythm and effort [Heart Rate And Rhythm] : heart rate and rhythm were normal [Heart Sounds] : normal S1 and S2 [Bowel Sounds] : normal bowel sounds [Abdomen Soft] : soft [] : no rash [Oriented To Time, Place, And Person] : oriented to person, place, and time [Affect] : the affect was normal [Impaired Insight] : insight and judgment were intact [Mood] : the mood was normal [FreeTextEntry1] : Obese [Abnormal Walk] : normal gait

## 2020-02-04 ENCOUNTER — RX RENEWAL (OUTPATIENT)
Age: 55
End: 2020-02-04

## 2020-02-12 ENCOUNTER — NON-APPOINTMENT (OUTPATIENT)
Age: 55
End: 2020-02-12

## 2020-02-12 ENCOUNTER — APPOINTMENT (OUTPATIENT)
Dept: CARDIOLOGY | Facility: CLINIC | Age: 55
End: 2020-02-12
Payer: COMMERCIAL

## 2020-02-12 VITALS
BODY MASS INDEX: 35.55 KG/M2 | SYSTOLIC BLOOD PRESSURE: 140 MMHG | HEART RATE: 69 BPM | OXYGEN SATURATION: 96 % | DIASTOLIC BLOOD PRESSURE: 90 MMHG | WEIGHT: 240 LBS | HEIGHT: 69 IN

## 2020-02-12 VITALS — SYSTOLIC BLOOD PRESSURE: 130 MMHG | DIASTOLIC BLOOD PRESSURE: 84 MMHG

## 2020-02-12 PROCEDURE — 93000 ELECTROCARDIOGRAM COMPLETE: CPT

## 2020-02-12 PROCEDURE — 99214 OFFICE O/P EST MOD 30 MIN: CPT

## 2020-02-12 NOTE — PHYSICAL EXAM
[Well Groomed] : well groomed [General Appearance - In No Acute Distress] : no acute distress [Respiration, Rhythm And Depth] : normal respiratory rhythm and effort [Heart Rate And Rhythm] : heart rate and rhythm were normal [Heart Sounds] : normal S1 and S2 [Bowel Sounds] : normal bowel sounds [Abdomen Soft] : soft [Abnormal Walk] : normal gait [] : no rash [Oriented To Time, Place, And Person] : oriented to person, place, and time [Impaired Insight] : insight and judgment were intact [Affect] : the affect was normal [Mood] : the mood was normal [Eyelids - No Xanthelasma] : the eyelids demonstrated no xanthelasmas [FreeTextEntry1] : Obese, appears well [Cyanosis, Localized] : no localized cyanosis

## 2020-02-12 NOTE — REVIEW OF SYSTEMS
[see HPI] : see HPI [Foot Pain] : foot pain [Under Stress] : under stress [Negative] : Heme/Lymph [Recent Weight Loss (___ Lbs)] : no recent weight loss [Shortness Of Breath] : no shortness of breath [Chest Pain] : no chest pain [Chest  Pressure] : no chest pressure [Dyspnea on exertion] : not dyspnea during exertion [Palpitations] : no palpitations

## 2020-02-12 NOTE — HISTORY OF PRESENT ILLNESS
[FreeTextEntry1] : Mark Partida is a 54 year old man with a history of HTN and obesity who I originally met in the [facility] Emergency Department on 10/17/19 after he was referred there by Dr. Monsivais for the evaluation of chest discomfort and hypertension.   His hypertension responded well to the reinitiation of antihypertensive therapy (following a five-year hiatus from medical attention).  His chest discomfort was atypical for angina and subsequent ischemia evaluation revealed the presence of normal myocardial perfusion. During our last encounter in December 2019 his blood pressure was elevated and I added losartan to his pharmacotherapy.  He has been feeling well in recent months; describes the recent passing of his father.  He has had no recurrence of chest discomfort and has been tolerating his antihypertensive regimen with good adherence.  He offers no complaints during today's encounter. He indicates the intent to begin an exercise regimen.

## 2020-02-12 NOTE — DISCUSSION/SUMMARY
[___ Month(s)] : [unfilled] month(s) [With Me] : with me [FreeTextEntry1] : \par Hypertension with mild LVH: Blood pressure has improved with pharmacotherapy and is now satisfactorily controlled; I stressed the importance of concomitant lifestyle modification and we discussed exercise (goal of 150 minutes per week); healthy diet; weight loss.  Continue metoprolol and losartan at current doses.\par \par Chest pain: No recurrence of previously described atypical chest symptoms; no ischemic changes on ECG and normal nuclear SPECT perfusion imaging.\par \par Abnormal ECG: Today's ECG is similar to previous tracing with sinus rhythm and LVH.

## 2020-04-30 ENCOUNTER — APPOINTMENT (OUTPATIENT)
Dept: INTERNAL MEDICINE | Facility: CLINIC | Age: 55
End: 2020-04-30

## 2020-05-06 ENCOUNTER — RX RENEWAL (OUTPATIENT)
Age: 55
End: 2020-05-06

## 2020-08-05 ENCOUNTER — APPOINTMENT (OUTPATIENT)
Age: 55
End: 2020-08-05
Payer: COMMERCIAL

## 2020-08-05 ENCOUNTER — NON-APPOINTMENT (OUTPATIENT)
Age: 55
End: 2020-08-05

## 2020-08-05 VITALS
TEMPERATURE: 99.1 F | RESPIRATION RATE: 18 BRPM | BODY MASS INDEX: 34.8 KG/M2 | OXYGEN SATURATION: 97 % | HEART RATE: 72 BPM | DIASTOLIC BLOOD PRESSURE: 96 MMHG | HEIGHT: 69 IN | WEIGHT: 235 LBS | SYSTOLIC BLOOD PRESSURE: 130 MMHG

## 2020-08-05 DIAGNOSIS — Z86.79 PERSONAL HISTORY OF OTHER DISEASES OF THE CIRCULATORY SYSTEM: ICD-10-CM

## 2020-08-05 DIAGNOSIS — S92.355A NONDISPLACED FRACTURE OF FIFTH METATARSAL BONE, LEFT FOOT, INITIAL ENCOUNTER FOR CLOSED FRACTURE: ICD-10-CM

## 2020-08-05 PROCEDURE — 93000 ELECTROCARDIOGRAM COMPLETE: CPT

## 2020-08-05 PROCEDURE — 99214 OFFICE O/P EST MOD 30 MIN: CPT

## 2020-08-05 NOTE — REASON FOR VISIT
[Follow-Up - Clinic] : a clinic follow-up of [Abnormal ECG] : an abnormal ECG [Medication Management] : Medication management [Hypertension] : hypertension

## 2020-08-05 NOTE — PHYSICAL EXAM
[Well Groomed] : well groomed [General Appearance - In No Acute Distress] : no acute distress [Respiration, Rhythm And Depth] : normal respiratory rhythm and effort [] : no respiratory distress [Heart Rate And Rhythm] : heart rate and rhythm were normal [Heart Sounds] : normal S1 and S2 [Abnormal Walk] : normal gait [Impaired Insight] : insight and judgment were intact [Oriented To Time, Place, And Person] : oriented to person, place, and time [Affect] : the affect was normal [Mood] : the mood was normal [Murmurs] : no murmurs present [FreeTextEntry1] : No JVD [Edema] : no peripheral edema present

## 2020-08-05 NOTE — REVIEW OF SYSTEMS
[see HPI] : see HPI [Foot Pain] : foot pain [Under Stress] : under stress [Negative] : Heme/Lymph [Headache] : no headache [Dyspnea on exertion] : not dyspnea during exertion [Shortness Of Breath] : no shortness of breath [Chest Pain] : no chest pain [Chest  Pressure] : no chest pressure [Palpitations] : no palpitations

## 2020-08-05 NOTE — HISTORY OF PRESENT ILLNESS
[FreeTextEntry1] : Mark Partida is a 54 year old man with a history of HTN and obesity who I originally met in the [facility] Emergency Department on 10/17/19 for the evaluation of chest discomfort and hypertension.   His hypertension responded well to the reinitiation of antihypertensive therapy (following a five-year hiatus from medical attention).  His chest discomfort was atypical for angina and subsequent ischemia evaluation was normal.  His blood pressure responded well to metoprolol and losartan.  However, he says that he "ran out" of losartan nearly one month ago because his pharmacy couldn't fill the prescription.  He continues to feel well and offers no new complaints; he never got around to initiating an exercise regimen but plans to do so.  He continues to work at [employer] and describes a good baseline functional status. He has not been experiencing chest pain or dyspnea

## 2020-08-05 NOTE — DISCUSSION/SUMMARY
[___ Month(s)] : [unfilled] month(s) [With Me] : with me [FreeTextEntry1] : \par Hypertension:  Blood pressure elevated today and not at goal probably due to his discontinuation of losartan approximately one month ago -- I refilled his losartan and he will continue metoprolol and return for blood pressure check in one month\par \par Chest pain: Resolved; good baseline functional status; normal nuclear stress test in November 2019.\par \par Obesity: We again discussed the importance of lifestyle modification with diet, exercise, and weight loss.\par \par ** I reminded him to return to his primary care physician for a complete physical.

## 2020-09-09 ENCOUNTER — APPOINTMENT (OUTPATIENT)
Dept: CARDIOLOGY | Facility: CLINIC | Age: 55
End: 2020-09-09

## 2020-10-28 ENCOUNTER — RX RENEWAL (OUTPATIENT)
Age: 55
End: 2020-10-28

## 2021-06-01 ENCOUNTER — NON-APPOINTMENT (OUTPATIENT)
Age: 56
End: 2021-06-01

## 2021-07-07 ENCOUNTER — APPOINTMENT (OUTPATIENT)
Dept: INTERNAL MEDICINE | Facility: CLINIC | Age: 56
End: 2021-07-07

## 2021-08-11 ENCOUNTER — APPOINTMENT (OUTPATIENT)
Dept: INTERNAL MEDICINE | Facility: CLINIC | Age: 56
End: 2021-08-11

## 2021-09-09 ENCOUNTER — APPOINTMENT (OUTPATIENT)
Dept: INTERNAL MEDICINE | Facility: CLINIC | Age: 56
End: 2021-09-09
Payer: COMMERCIAL

## 2021-09-09 VITALS
HEIGHT: 69 IN | SYSTOLIC BLOOD PRESSURE: 140 MMHG | WEIGHT: 239 LBS | HEART RATE: 62 BPM | TEMPERATURE: 99 F | OXYGEN SATURATION: 96 % | RESPIRATION RATE: 18 BRPM | DIASTOLIC BLOOD PRESSURE: 84 MMHG | BODY MASS INDEX: 35.4 KG/M2

## 2021-09-09 PROCEDURE — 99214 OFFICE O/P EST MOD 30 MIN: CPT

## 2021-09-09 NOTE — PLAN
[FreeTextEntry1] : Mr. Partida presents for a followup evaluation. His been almost 2 years since his last evaluation. He will continue on metoprolol ER 50 mg daily and losartan 50 mg daily. Patient will have a cardiology followup with Dr. See. Patient is to get a prescription for CBC, CMP plus hemoglobin A1c, iron level, lipid profile, PSA level, TSH and urinalysis. He is also been referred to Dr. Hicks in for screening colonoscopy. Followup in 6 months

## 2021-09-09 NOTE — HISTORY OF PRESENT ILLNESS
[FreeTextEntry1] : Followup [de-identified] : Mr. Partida presents for followup evaluation. No chest pain, shortness of breath or palpitations.

## 2021-09-09 NOTE — PHYSICAL EXAM
Letter printed, signed and placed up front.      cecilio Triplett called and advised.    [No Acute Distress] : no acute distress [Well Nourished] : well nourished [Well Developed] : well developed [Well-Appearing] : well-appearing [Normal Sclera/Conjunctiva] : normal sclera/conjunctiva [PERRL] : pupils equal round and reactive to light [EOMI] : extraocular movements intact [Normal Outer Ear/Nose] : the outer ears and nose were normal in appearance [Normal Oropharynx] : the oropharynx was normal [No JVD] : no jugular venous distention [No Lymphadenopathy] : no lymphadenopathy [Supple] : supple [Thyroid Normal, No Nodules] : the thyroid was normal and there were no nodules present [No Respiratory Distress] : no respiratory distress  [No Accessory Muscle Use] : no accessory muscle use [Clear to Auscultation] : lungs were clear to auscultation bilaterally [Normal Rate] : normal rate  [Regular Rhythm] : with a regular rhythm [Normal S1, S2] : normal S1 and S2 [No Murmur] : no murmur heard [No Carotid Bruits] : no carotid bruits [No Abdominal Bruit] : a ~M bruit was not heard ~T in the abdomen [No Varicosities] : no varicosities [Pedal Pulses Present] : the pedal pulses are present [No Edema] : there was no peripheral edema [No Palpable Aorta] : no palpable aorta [No Extremity Clubbing/Cyanosis] : no extremity clubbing/cyanosis [Soft] : abdomen soft [Non Tender] : non-tender [Non-distended] : non-distended [No Masses] : no abdominal mass palpated [No HSM] : no HSM [Normal Bowel Sounds] : normal bowel sounds [Normal Posterior Cervical Nodes] : no posterior cervical lymphadenopathy [Normal Anterior Cervical Nodes] : no anterior cervical lymphadenopathy [No CVA Tenderness] : no CVA  tenderness [No Spinal Tenderness] : no spinal tenderness [No Joint Swelling] : no joint swelling [Grossly Normal Strength/Tone] : grossly normal strength/tone [No Rash] : no rash [Coordination Grossly Intact] : coordination grossly intact [No Focal Deficits] : no focal deficits [Normal Gait] : normal gait [Deep Tendon Reflexes (DTR)] : deep tendon reflexes were 2+ and symmetric [Normal Affect] : the affect was normal [Normal Insight/Judgement] : insight and judgment were intact

## 2021-09-17 ENCOUNTER — APPOINTMENT (OUTPATIENT)
Dept: CARDIOLOGY | Facility: CLINIC | Age: 56
End: 2021-09-17
Payer: COMMERCIAL

## 2021-09-17 ENCOUNTER — NON-APPOINTMENT (OUTPATIENT)
Age: 56
End: 2021-09-17

## 2021-09-17 VITALS
OXYGEN SATURATION: 97 % | SYSTOLIC BLOOD PRESSURE: 143 MMHG | HEIGHT: 69 IN | HEART RATE: 67 BPM | WEIGHT: 240 LBS | DIASTOLIC BLOOD PRESSURE: 84 MMHG | BODY MASS INDEX: 35.55 KG/M2

## 2021-09-17 PROCEDURE — 99214 OFFICE O/P EST MOD 30 MIN: CPT

## 2021-09-17 PROCEDURE — 93000 ELECTROCARDIOGRAM COMPLETE: CPT

## 2021-09-17 NOTE — DISCUSSION/SUMMARY
[With Me] : with me [___ Month(s)] : in [unfilled] month(s) [FreeTextEntry1] : \par Hypertension: Blood pressure is suboptimally controlled and ECG suggests the presence of left ventricular hypertrophy; increase losartan to 100 mg daily.\par \par Obesity: We discussed the importance of diet, exercise, and weight loss given the presence of hypertension; he will have labs drawn tomorrow (including metabolic panel, lipid panel).\par \par Atypical chest pain: Previous complaints of chest pain has not recurred; good baseline functional status; recently started exercising on a stationary bicycle with no anginal symptoms.

## 2021-09-17 NOTE — REVIEW OF SYSTEMS
[Weight Loss (___ Lbs)] : no recent weight loss [SOB] : no shortness of breath [Dyspnea on exertion] : not dyspnea during exertion [Palpitations] : no palpitations [FreeTextEntry9] : Back pain (lower)

## 2021-09-17 NOTE — PHYSICAL EXAM
[Well Groomed] : well groomed [General Appearance - In No Acute Distress] : no acute distress [] : no respiratory distress [Respiration, Rhythm And Depth] : normal respiratory rhythm and effort [Heart Rate And Rhythm] : heart rate and rhythm were normal [Heart Sounds] : normal S1 and S2 [Murmurs] : no murmurs present [Edema] : no peripheral edema present [Abnormal Walk] : normal gait [Oriented To Time, Place, And Person] : oriented to person, place, and time [Impaired Insight] : insight and judgment were intact [Affect] : the affect was normal [Mood] : the mood was normal [FreeTextEntry1] : Varicose veins left proximal thigh

## 2021-09-17 NOTE — HISTORY OF PRESENT ILLNESS
[FreeTextEntry1] : Mark Partida is a 55 year old man with a history of hypertension and obesity who returns for cardiac examination - our last encounter was approximately 13 months ago.  He has been doing well - compliant with medications; continues to work at [employer] and active (> 15K steps/day).  He denies angina.  He complains of intermittent low back pain.

## 2021-10-08 ENCOUNTER — TRANSCRIPTION ENCOUNTER (OUTPATIENT)
Age: 56
End: 2021-10-08

## 2021-10-10 LAB
ALBUMIN SERPL ELPH-MCNC: 4.2 G/DL
ALP BLD-CCNC: 55 U/L
ALT SERPL-CCNC: 20 U/L
ANION GAP SERPL CALC-SCNC: 13 MMOL/L
APPEARANCE: CLEAR
AST SERPL-CCNC: 17 U/L
BACTERIA: NEGATIVE
BASOPHILS # BLD AUTO: 0.09 K/UL
BASOPHILS NFR BLD AUTO: 1.1 %
BILIRUB SERPL-MCNC: 0.4 MG/DL
BILIRUBIN URINE: NEGATIVE
BLOOD URINE: NEGATIVE
BUN SERPL-MCNC: 16 MG/DL
CALCIUM OXALATE CRYSTALS: ABNORMAL
CALCIUM SERPL-MCNC: 9.4 MG/DL
CHLORIDE SERPL-SCNC: 105 MMOL/L
CHOLEST SERPL-MCNC: 199 MG/DL
CO2 SERPL-SCNC: 24 MMOL/L
COLOR: YELLOW
CREAT SERPL-MCNC: 0.99 MG/DL
EOSINOPHIL # BLD AUTO: 0.37 K/UL
EOSINOPHIL NFR BLD AUTO: 4.3 %
ESTIMATED AVERAGE GLUCOSE: 111 MG/DL
GLUCOSE QUALITATIVE U: NEGATIVE
GLUCOSE SERPL-MCNC: 110 MG/DL
HBA1C MFR BLD HPLC: 5.5 %
HCT VFR BLD CALC: 45.4 %
HDLC SERPL-MCNC: 43 MG/DL
HGB BLD-MCNC: 15.1 G/DL
HYALINE CASTS: 0 /LPF
IMM GRANULOCYTES NFR BLD AUTO: 0.4 %
IRON SERPL-MCNC: 91 UG/DL
KETONES URINE: NEGATIVE
LDLC SERPL CALC-MCNC: 117 MG/DL
LEUKOCYTE ESTERASE URINE: NEGATIVE
LYMPHOCYTES # BLD AUTO: 3.12 K/UL
LYMPHOCYTES NFR BLD AUTO: 36.7 %
MAN DIFF?: NORMAL
MCHC RBC-ENTMCNC: 30.8 PG
MCHC RBC-ENTMCNC: 33.3 GM/DL
MCV RBC AUTO: 92.5 FL
MICROSCOPIC-UA: NORMAL
MONOCYTES # BLD AUTO: 0.63 K/UL
MONOCYTES NFR BLD AUTO: 7.4 %
NEUTROPHILS # BLD AUTO: 4.27 K/UL
NEUTROPHILS NFR BLD AUTO: 50.1 %
NITRITE URINE: NEGATIVE
NONHDLC SERPL-MCNC: 155 MG/DL
PH URINE: 6
PLATELET # BLD AUTO: 216 K/UL
POTASSIUM SERPL-SCNC: 4.3 MMOL/L
PROT SERPL-MCNC: 6.5 G/DL
PROTEIN URINE: NORMAL
PSA SERPL-MCNC: 1.47 NG/ML
RBC # BLD: 4.91 M/UL
RBC # FLD: 12.8 %
RED BLOOD CELLS URINE: 2 /HPF
SODIUM SERPL-SCNC: 142 MMOL/L
SPECIFIC GRAVITY URINE: 1.03
SQUAMOUS EPITHELIAL CELLS: 0 /HPF
TRIGL SERPL-MCNC: 190 MG/DL
TSH SERPL-ACNC: 1.47 UIU/ML
UROBILINOGEN URINE: NORMAL
WBC # FLD AUTO: 8.51 K/UL
WHITE BLOOD CELLS URINE: 1 /HPF

## 2021-10-15 ENCOUNTER — NON-APPOINTMENT (OUTPATIENT)
Age: 56
End: 2021-10-15

## 2021-11-17 ENCOUNTER — APPOINTMENT (OUTPATIENT)
Dept: CARDIOLOGY | Facility: CLINIC | Age: 56
End: 2021-11-17
Payer: COMMERCIAL

## 2021-11-17 VITALS — DIASTOLIC BLOOD PRESSURE: 84 MMHG | SYSTOLIC BLOOD PRESSURE: 136 MMHG

## 2021-11-17 VITALS
OXYGEN SATURATION: 99 % | DIASTOLIC BLOOD PRESSURE: 82 MMHG | SYSTOLIC BLOOD PRESSURE: 150 MMHG | HEIGHT: 69 IN | WEIGHT: 236 LBS | BODY MASS INDEX: 34.96 KG/M2 | HEART RATE: 55 BPM

## 2021-11-17 DIAGNOSIS — R07.89 OTHER CHEST PAIN: ICD-10-CM

## 2021-11-17 PROCEDURE — 99214 OFFICE O/P EST MOD 30 MIN: CPT

## 2021-11-17 NOTE — PHYSICAL EXAM
[Well Groomed] : well groomed [General Appearance - In No Acute Distress] : no acute distress [] : no respiratory distress [Respiration, Rhythm And Depth] : normal respiratory rhythm and effort [Heart Rate And Rhythm] : heart rate and rhythm were normal [Heart Sounds] : normal S1 and S2 [Murmurs] : no murmurs present [Edema] : no peripheral edema present [Abnormal Walk] : normal gait [Oriented To Time, Place, And Person] : oriented to person, place, and time [Impaired Insight] : insight and judgment were intact [FreeTextEntry1] : Varicose veins left proximal thigh

## 2021-11-17 NOTE — DISCUSSION/SUMMARY
[With Me] : with me [___ Month(s)] : in [unfilled] month(s) [FreeTextEntry1] : \par Hypertension: Blood pressure mildly elevated - continue losartan and metoprolol for now; I stressed the importance of continued lifestyle modification (weight loss, exercise, decrease consumption of sodium) -- reevaluate in several months and if persistently elevated will consider adding a diuretic.\par \par Hyperlipidemia: Tolerating rosuvastatin - agree with continued use.\par \par Obesity: Weight loss is necessary to optimize blood pressure and cholesterol - he spent time during this visit discussing appropriate lifestyle intervention.\par

## 2021-11-17 NOTE — HISTORY OF PRESENT ILLNESS
[FreeTextEntry1] : Mark Partida is a 56 year old man with a history of hypertension and obesity who returns for cardiac examination. During our last encounter in September 2021 his blood pressure was suboptimally controlled and I increased his dose of losartan to 100 mg daily.  He was subsequently prescribed rosuvastatin by his primary care physician.  He has been feeling well - could adherence with prescribed pharmacotherapy; eating healthier -- "less takeout."

## 2021-11-17 NOTE — REVIEW OF SYSTEMS
[Headache] : no headache [Feeling Fatigued] : not feeling fatigued [Weight Loss (___ Lbs)] : [unfilled] ~Ulb weight loss [SOB] : no shortness of breath [Dyspnea on exertion] : not dyspnea during exertion [Palpitations] : no palpitations [Under Stress] : under stress [FreeTextEntry9] : Back pain (lower)

## 2021-12-13 ENCOUNTER — RX RENEWAL (OUTPATIENT)
Age: 56
End: 2021-12-13

## 2022-03-16 ENCOUNTER — APPOINTMENT (OUTPATIENT)
Dept: CARDIOLOGY | Facility: CLINIC | Age: 57
End: 2022-03-16
Payer: COMMERCIAL

## 2022-03-16 ENCOUNTER — NON-APPOINTMENT (OUTPATIENT)
Age: 57
End: 2022-03-16

## 2022-03-16 VITALS
HEIGHT: 69 IN | WEIGHT: 240 LBS | HEART RATE: 66 BPM | SYSTOLIC BLOOD PRESSURE: 150 MMHG | BODY MASS INDEX: 35.55 KG/M2 | OXYGEN SATURATION: 97 % | DIASTOLIC BLOOD PRESSURE: 80 MMHG

## 2022-03-16 VITALS — DIASTOLIC BLOOD PRESSURE: 82 MMHG | SYSTOLIC BLOOD PRESSURE: 144 MMHG

## 2022-03-16 PROCEDURE — 93000 ELECTROCARDIOGRAM COMPLETE: CPT

## 2022-03-16 PROCEDURE — 99214 OFFICE O/P EST MOD 30 MIN: CPT

## 2022-03-16 NOTE — DISCUSSION/SUMMARY
[With Me] : with me [___ Month(s)] : in [unfilled] month(s) [FreeTextEntry1] : \par Hypertension: Suboptimally controlled; continue losartan and metoprolol; add HCTZ 25 mg daily.  Blood pressure will be rechecked when he sees Dr. Monsivais next week,\par \par Hyperlipidemia: Tolerating rosuvastatin; check lipid panel.\par \par Obesity: Small weight gain since our last encounter - we spent time discussing dietary modification and weight loss; check hemoglobin A1c to screen for diabetes

## 2022-03-16 NOTE — HISTORY OF PRESENT ILLNESS
[FreeTextEntry1] : Mark Partida is a 56 year old man with a history of hypertension, hyperlipidemia, and obesity who returns for cardiac examination. He has been feeling well but no success so far with weight loss.  He has been tolerating prescribed pharmacotherapy and describes good adherence.

## 2022-03-16 NOTE — REVIEW OF SYSTEMS
[Weight Gain (___ Lbs)] : [unfilled] ~Ulb weight gain [SOB] : no shortness of breath [Dyspnea on exertion] : not dyspnea during exertion [Chest Discomfort] : no chest discomfort

## 2022-03-16 NOTE — PHYSICAL EXAM
[No Acute Distress] : no acute distress [Obese] : obese [Normal S1, S2] : normal S1, S2 [No Murmur] : no murmur [Clear Lung Fields] : clear lung fields [Normal Gait] : normal gait [No Edema] : no edema [Alert and Oriented] : alert and oriented [de-identified] : Pupils round [de-identified] : Wearing a face mask

## 2022-03-24 ENCOUNTER — APPOINTMENT (OUTPATIENT)
Dept: INTERNAL MEDICINE | Facility: CLINIC | Age: 57
End: 2022-03-24
Payer: COMMERCIAL

## 2022-03-24 VITALS
WEIGHT: 236 LBS | OXYGEN SATURATION: 97 % | HEART RATE: 60 BPM | HEIGHT: 69 IN | SYSTOLIC BLOOD PRESSURE: 122 MMHG | TEMPERATURE: 98.3 F | RESPIRATION RATE: 16 BRPM | BODY MASS INDEX: 34.96 KG/M2 | DIASTOLIC BLOOD PRESSURE: 84 MMHG

## 2022-03-24 PROCEDURE — 99214 OFFICE O/P EST MOD 30 MIN: CPT

## 2022-03-24 NOTE — PLAN
[FreeTextEntry1] : Mr. Partida presents for a followup evaluation. He will continue on current medication regimen which includes losartan 100 mg daily metoprolol 50 mg daily and hydrochlorothiazide 25 mg daily. He is also on rosuvastatin 5 mg daily  for his cholesterol. Patient has been referred to Dr. Whitfield for screening colonoscopy. Cardiology followup will continue with Dr. See. Follow up in this office in 6 months.

## 2022-03-24 NOTE — HISTORY OF PRESENT ILLNESS
[FreeTextEntry1] : Followup [de-identified] : Mr. Partida presents for followup evaluation. He is a 56-year-old male with a history of hypertension and hyperlipidemia. He remains overweight. Patient denies any chest pain, shortness of breath palpitations. He has no nocturnal symptoms of cough or dyspnea. There is no hematuria or dysuria.

## 2022-03-27 LAB
ALBUMIN SERPL ELPH-MCNC: 4.4 G/DL
ALP BLD-CCNC: 63 U/L
ALT SERPL-CCNC: 19 U/L
ANION GAP SERPL CALC-SCNC: 11 MMOL/L
AST SERPL-CCNC: 19 U/L
BILIRUB SERPL-MCNC: 0.6 MG/DL
BUN SERPL-MCNC: 12 MG/DL
CALCIUM SERPL-MCNC: 9.4 MG/DL
CHLORIDE SERPL-SCNC: 102 MMOL/L
CHOLEST SERPL-MCNC: 138 MG/DL
CO2 SERPL-SCNC: 27 MMOL/L
CREAT SERPL-MCNC: 0.99 MG/DL
EGFR: 90 ML/MIN/1.73M2
ESTIMATED AVERAGE GLUCOSE: 120 MG/DL
GLUCOSE SERPL-MCNC: 99 MG/DL
HBA1C MFR BLD HPLC: 5.8 %
HDLC SERPL-MCNC: 40 MG/DL
LDLC SERPL CALC-MCNC: 78 MG/DL
NONHDLC SERPL-MCNC: 98 MG/DL
POTASSIUM SERPL-SCNC: 4.4 MMOL/L
PROT SERPL-MCNC: 6.7 G/DL
SODIUM SERPL-SCNC: 141 MMOL/L
TRIGL SERPL-MCNC: 102 MG/DL

## 2022-04-19 ENCOUNTER — TRANSCRIPTION ENCOUNTER (OUTPATIENT)
Age: 57
End: 2022-04-19

## 2022-04-20 ENCOUNTER — RX RENEWAL (OUTPATIENT)
Age: 57
End: 2022-04-20

## 2022-06-15 ENCOUNTER — APPOINTMENT (OUTPATIENT)
Dept: CARDIOLOGY | Facility: CLINIC | Age: 57
End: 2022-06-15
Payer: COMMERCIAL

## 2022-06-15 VITALS
OXYGEN SATURATION: 95 % | WEIGHT: 244 LBS | HEART RATE: 83 BPM | HEIGHT: 69 IN | SYSTOLIC BLOOD PRESSURE: 130 MMHG | DIASTOLIC BLOOD PRESSURE: 80 MMHG | BODY MASS INDEX: 36.14 KG/M2

## 2022-06-15 VITALS
RESPIRATION RATE: 14 BRPM | HEIGHT: 69 IN | SYSTOLIC BLOOD PRESSURE: 130 MMHG | BODY MASS INDEX: 36.14 KG/M2 | OXYGEN SATURATION: 95 % | HEART RATE: 83 BPM | DIASTOLIC BLOOD PRESSURE: 80 MMHG | WEIGHT: 244 LBS

## 2022-06-15 PROCEDURE — 99214 OFFICE O/P EST MOD 30 MIN: CPT

## 2022-06-15 NOTE — HISTORY OF PRESENT ILLNESS
[FreeTextEntry1] : Mark Partida is a 56 year old man with a history of hypertension, hyperlipidemia, prediabetes, and obesity who returns for cardiac examination.  During our last encounter in March his blood pressure was suboptimally controlled and I prescribed HCTZ 25 mg once daily -blood pressure was much better when he saw his primary care physician subsequently.  He continues to feel well and offers no new complaints during today's encounter -  stopped working at [employer] and now looking for new employment;  no chest discomfort, difficulty breathing, or palpitations;  compliant with prescribed pharmacotherapy.

## 2022-06-15 NOTE — PHYSICAL EXAM
[Obese] : obese [Normal S1, S2] : normal S1, S2 [Clear Lung Fields] : clear lung fields [No Respiratory Distress] : no respiratory distress  [Normal Gait] : normal gait [No Edema] : no edema [Alert and Oriented] : alert and oriented [de-identified] :  extraocular muscles intact [de-identified] : Wearing a face mask [de-identified] :  no JVD

## 2022-06-15 NOTE — REVIEW OF SYSTEMS
[Weight Gain (___ Lbs)] : [unfilled] ~Ulb weight gain [SOB] : no shortness of breath [Chest Discomfort] : no chest discomfort [Palpitations] : no palpitations

## 2022-06-15 NOTE — DISCUSSION/SUMMARY
[With Me] : with me [___ Month(s)] : in [unfilled] month(s) [FreeTextEntry1] : \par Hypertension:  blood pressure has improved with addition of hydrochlorothiazide and is now at goal;  continue present doses of losartan, HCTZ, and metoprolol.  I encouraged daily exercise, diet, and weight loss.\par \par Hyperlipidemia: Controlled; continue rosuvastatin\par \par Obesity: body mass index is consistent with obesity and weight has increased since I last saw him several months ago;  I recommended 30 to 60 minutes of daily exercise and a goal of weight loss.\par \par **  I offered to see Mr. Partida annually but he would like to return to see me in approximately 6 months for follow-up

## 2022-09-22 ENCOUNTER — APPOINTMENT (OUTPATIENT)
Dept: INTERNAL MEDICINE | Facility: CLINIC | Age: 57
End: 2022-09-22

## 2022-12-16 ENCOUNTER — APPOINTMENT (OUTPATIENT)
Dept: CARDIOLOGY | Facility: CLINIC | Age: 57
End: 2022-12-16

## 2023-01-04 ENCOUNTER — NON-APPOINTMENT (OUTPATIENT)
Age: 58
End: 2023-01-04

## 2023-05-19 NOTE — ED PROVIDER NOTE - CPE EDP ENMT NORM
Artifact, not true tachy. 0% AF since 3/7/23. Reviewed. No change. Continue to monitor.      normal...

## 2023-06-12 ENCOUNTER — RX RENEWAL (OUTPATIENT)
Age: 58
End: 2023-06-12

## 2023-06-13 ENCOUNTER — RX RENEWAL (OUTPATIENT)
Age: 58
End: 2023-06-13

## 2023-06-17 ENCOUNTER — TRANSCRIPTION ENCOUNTER (OUTPATIENT)
Age: 58
End: 2023-06-17

## 2023-06-19 ENCOUNTER — TRANSCRIPTION ENCOUNTER (OUTPATIENT)
Age: 58
End: 2023-06-19

## 2023-08-07 LAB
ALBUMIN SERPL ELPH-MCNC: 4.2 G/DL
ALP BLD-CCNC: 63 U/L
ALT SERPL-CCNC: 21 U/L
ANION GAP SERPL CALC-SCNC: 15 MMOL/L
AST SERPL-CCNC: 21 U/L
BILIRUB SERPL-MCNC: 0.6 MG/DL
BUN SERPL-MCNC: 11 MG/DL
CALCIUM SERPL-MCNC: 9.2 MG/DL
CHLORIDE SERPL-SCNC: 98 MMOL/L
CHOLEST SERPL-MCNC: 141 MG/DL
CO2 SERPL-SCNC: 28 MMOL/L
CREAT SERPL-MCNC: 1.11 MG/DL
EGFR: 78 ML/MIN/1.73M2
ESTIMATED AVERAGE GLUCOSE: 126 MG/DL
GLUCOSE SERPL-MCNC: 91 MG/DL
HBA1C MFR BLD HPLC: 6 %
HDLC SERPL-MCNC: 38 MG/DL
LDLC SERPL CALC-MCNC: 71 MG/DL
NONHDLC SERPL-MCNC: 103 MG/DL
POTASSIUM SERPL-SCNC: 3.8 MMOL/L
PROT SERPL-MCNC: 6.6 G/DL
SODIUM SERPL-SCNC: 141 MMOL/L
TRIGL SERPL-MCNC: 184 MG/DL
TSH SERPL-ACNC: 3.18 UIU/ML

## 2023-09-12 ENCOUNTER — RX RENEWAL (OUTPATIENT)
Age: 58
End: 2023-09-12

## 2023-10-04 ENCOUNTER — NON-APPOINTMENT (OUTPATIENT)
Age: 58
End: 2023-10-04

## 2023-10-04 ENCOUNTER — APPOINTMENT (OUTPATIENT)
Dept: CARDIOLOGY | Facility: CLINIC | Age: 58
End: 2023-10-04
Payer: COMMERCIAL

## 2023-10-04 ENCOUNTER — APPOINTMENT (OUTPATIENT)
Dept: INTERNAL MEDICINE | Facility: CLINIC | Age: 58
End: 2023-10-04
Payer: COMMERCIAL

## 2023-10-04 VITALS
WEIGHT: 243 LBS | DIASTOLIC BLOOD PRESSURE: 80 MMHG | SYSTOLIC BLOOD PRESSURE: 110 MMHG | OXYGEN SATURATION: 99 % | BODY MASS INDEX: 35.89 KG/M2 | HEART RATE: 63 BPM

## 2023-10-04 VITALS
RESPIRATION RATE: 16 BRPM | BODY MASS INDEX: 35.99 KG/M2 | TEMPERATURE: 97.9 F | OXYGEN SATURATION: 97 % | HEIGHT: 69 IN | HEART RATE: 68 BPM | DIASTOLIC BLOOD PRESSURE: 84 MMHG | WEIGHT: 243 LBS | SYSTOLIC BLOOD PRESSURE: 128 MMHG

## 2023-10-04 DIAGNOSIS — Z00.00 ENCOUNTER FOR GENERAL ADULT MEDICAL EXAMINATION W/OUT ABNORMAL FINDINGS: ICD-10-CM

## 2023-10-04 DIAGNOSIS — R22.1 LOCALIZED SWELLING, MASS AND LUMP, NECK: ICD-10-CM

## 2023-10-04 PROCEDURE — 36415 COLL VENOUS BLD VENIPUNCTURE: CPT

## 2023-10-04 PROCEDURE — 99214 OFFICE O/P EST MOD 30 MIN: CPT | Mod: 25

## 2023-10-04 PROCEDURE — 99213 OFFICE O/P EST LOW 20 MIN: CPT | Mod: 25

## 2023-10-04 PROCEDURE — 93000 ELECTROCARDIOGRAM COMPLETE: CPT

## 2023-10-04 RX ORDER — UBIDECARENONE 200 MG
200 CAPSULE ORAL DAILY
Refills: 0 | Status: ACTIVE | COMMUNITY

## 2023-10-11 ENCOUNTER — APPOINTMENT (OUTPATIENT)
Dept: ULTRASOUND IMAGING | Facility: CLINIC | Age: 58
End: 2023-10-11
Payer: COMMERCIAL

## 2023-10-11 PROCEDURE — 76536 US EXAM OF HEAD AND NECK: CPT

## 2023-10-12 ENCOUNTER — RX RENEWAL (OUTPATIENT)
Age: 58
End: 2023-10-12

## 2023-10-12 RX ORDER — METOPROLOL SUCCINATE 50 MG/1
50 TABLET, EXTENDED RELEASE ORAL
Qty: 90 | Refills: 2 | Status: ACTIVE | COMMUNITY
Start: 2019-10-23 | End: 1900-01-01

## 2023-12-28 ENCOUNTER — APPOINTMENT (OUTPATIENT)
Dept: INTERNAL MEDICINE | Facility: CLINIC | Age: 58
End: 2023-12-28
Payer: COMMERCIAL

## 2023-12-28 VITALS
HEART RATE: 63 BPM | WEIGHT: 248 LBS | DIASTOLIC BLOOD PRESSURE: 72 MMHG | BODY MASS INDEX: 36.73 KG/M2 | OXYGEN SATURATION: 97 % | HEIGHT: 69 IN | RESPIRATION RATE: 16 BRPM | TEMPERATURE: 98 F | SYSTOLIC BLOOD PRESSURE: 114 MMHG

## 2023-12-28 DIAGNOSIS — I10 ESSENTIAL (PRIMARY) HYPERTENSION: ICD-10-CM

## 2023-12-28 PROCEDURE — 99214 OFFICE O/P EST MOD 30 MIN: CPT

## 2023-12-28 RX ORDER — OMEPRAZOLE 20 MG/1
20 CAPSULE, DELAYED RELEASE ORAL DAILY
Refills: 0 | Status: ACTIVE | COMMUNITY

## 2023-12-29 ENCOUNTER — APPOINTMENT (OUTPATIENT)
Dept: CARDIOLOGY | Facility: CLINIC | Age: 58
End: 2023-12-29
Payer: COMMERCIAL

## 2023-12-29 ENCOUNTER — NON-APPOINTMENT (OUTPATIENT)
Age: 58
End: 2023-12-29

## 2023-12-29 VITALS
OXYGEN SATURATION: 96 % | BODY MASS INDEX: 37.18 KG/M2 | SYSTOLIC BLOOD PRESSURE: 102 MMHG | WEIGHT: 251 LBS | HEIGHT: 69 IN | DIASTOLIC BLOOD PRESSURE: 82 MMHG | HEART RATE: 57 BPM

## 2023-12-29 VITALS — DIASTOLIC BLOOD PRESSURE: 80 MMHG | SYSTOLIC BLOOD PRESSURE: 108 MMHG

## 2023-12-29 PROCEDURE — 99214 OFFICE O/P EST MOD 30 MIN: CPT | Mod: 25

## 2023-12-29 PROCEDURE — 93000 ELECTROCARDIOGRAM COMPLETE: CPT | Mod: NC

## 2023-12-29 NOTE — HISTORY OF PRESENT ILLNESS
[FreeTextEntry1] : Mark Partida is a 58-year-old man with a history of hypertension, hyperlipidemia, prediabetes, and obesity who returns for cardiac examination. He was recently found to have a colon mass on routine colonoscopy and is now scheduled for robotic low anterior resection with Dr James Ellis on 1/5/2024 at [facility] on January 5, 2024.  He requests preoperative cardiac examination.  He continues to feel well from a cardiac standpoint and has not been experiencing angina, dyspnea, palpitations, or syncope.  He has a good baseline functional status but has been sedentary.  There is no history of prior surgical procedures.

## 2023-12-29 NOTE — DISCUSSION/SUMMARY
[With Me] : with me [___ Month(s)] : in [unfilled] month(s) [FreeTextEntry1] :  Preoperative cardiac examination: Mark Partida is optimized from a cardiac standpoint for upcoming colon surgery.  I recommend continuing perioperative beta-blockade with metoprolol.  I asked him to stop HCTZ one day prior to surgery.  Hypertension: Controlled; Continue present antihypertensive regimen.  Hyperlipidemia: Controlled; continue rosuvastatin

## 2023-12-29 NOTE — CARDIOLOGY SUMMARY
[de-identified] : 12/29/2023. Normal sinus rhythm.  LVH. [de-identified] : 11/19/19 (nuclear), Normal myocardial perfusion with no clear evidence of infarction or inducible ischemia. Normal left ventricular function (ejection fraction 58%)   [de-identified] : 11/14/19, Endocardium not well visualized; grossly normal left ventricular systolic function. Mild concentric left ventricular hypertrophy. Normal right ventricular size and function. Minimal tricuspid regurgitation. Normal aortic size. LVEF 57%.

## 2023-12-29 NOTE — PHYSICAL EXAM
[Obese] : obese [Normal S1, S2] : normal S1, S2 [No Murmur] : no murmur [Clear Lung Fields] : clear lung fields [Normal Gait] : normal gait [No Edema] : no edema [Alert and Oriented] : alert and oriented [No Acute Distress] : no acute distress [de-identified] : Central obesity

## 2023-12-29 NOTE — REVIEW OF SYSTEMS
[Weight Gain (___ Lbs)] : [unfilled] ~Ulb weight gain [SOB] : no shortness of breath [Dyspnea on exertion] : not dyspnea during exertion [Chest Discomfort] : no chest discomfort [Dizziness] : no dizziness

## 2024-01-02 ENCOUNTER — TRANSCRIPTION ENCOUNTER (OUTPATIENT)
Age: 59
End: 2024-01-02

## 2024-01-03 NOTE — ADDENDUM
[FreeTextEntry1] : Preoperative lab work and EKG reviewed.  There is no contraindication to proceeding with colon resection.  Patient will benefit from DVT prophylaxis and incentive spirometry postoperatively.

## 2024-01-03 NOTE — HISTORY OF PRESENT ILLNESS
[(Patient denies any chest pain, claudication, dyspnea on exertion, orthopnea, palpitations or syncope)] : Patient denies any chest pain, claudication, dyspnea on exertion, orthopnea, palpitations or syncope [Aortic Stenosis] : no aortic stenosis [Atrial Fibrillation] : no atrial fibrillation [Coronary Artery Disease] : no coronary artery disease [Recent Myocardial Infarction] : no recent myocardial infarction [Implantable Device/Pacemaker] : no implantable device/pacemaker [Asthma] : no asthma [COPD] : no COPD [Sleep Apnea] : no sleep apnea [Smoker] : not a smoker [Chronic Anticoagulation] : no chronic anticoagulation [Chronic Kidney Disease] : no chronic kidney disease [Diabetes] : no diabetes [FreeTextEntry1] : Left colon resection [FreeTextEntry2] : 1/8/2024 [FreeTextEntry4] : Mr. Partida is a 58-year-old male who presents for preoperative evaluation for scheduled left-sided colon resection.  He has a history of hypertension, hyperlipidemia and GERD.  Patient had an abnormal colonoscopy in 10/23 which showed a colon mass.  Mr. Partida denies any chest pain, shortness of breath or palpitations.  There is no history of blood per rectum.  Patient is now scheduled for laparoscopic left-sided colon resection. [FreeTextEntry3] : Dr. Ted Newell [FreeTextEntry7] : Patient will receive cardiac clearance from Dr. Asad See.

## 2024-01-03 NOTE — ASSESSMENT
[Patient Optimized for Surgery] : Patient optimized for surgery [No Further Testing Recommended] : no further testing recommended [As per surgery] : as per surgery [FreeTextEntry4] : Mr. Partida is a 58-year-old male with a history of hypertension, hyperlipidemia and GERD who is now scheduled for left-sided colon resection.  He will have cardiology evaluation by Dr. Asad See.  Pending review of preoperative lab work there is no contraindication to planned procedure with sedation/anesthesia.

## 2024-02-08 ENCOUNTER — NON-APPOINTMENT (OUTPATIENT)
Age: 59
End: 2024-02-08

## 2024-04-03 ENCOUNTER — APPOINTMENT (OUTPATIENT)
Dept: CARDIOLOGY | Facility: CLINIC | Age: 59
End: 2024-04-03
Payer: COMMERCIAL

## 2024-04-03 ENCOUNTER — NON-APPOINTMENT (OUTPATIENT)
Age: 59
End: 2024-04-03

## 2024-04-03 VITALS
SYSTOLIC BLOOD PRESSURE: 112 MMHG | BODY MASS INDEX: 36.58 KG/M2 | WEIGHT: 247 LBS | HEIGHT: 69 IN | DIASTOLIC BLOOD PRESSURE: 82 MMHG | HEART RATE: 53 BPM | OXYGEN SATURATION: 99 %

## 2024-04-03 VITALS
SYSTOLIC BLOOD PRESSURE: 112 MMHG | BODY MASS INDEX: 36.58 KG/M2 | OXYGEN SATURATION: 99 % | HEIGHT: 69 IN | WEIGHT: 247 LBS | HEART RATE: 53 BPM | RESPIRATION RATE: 15 BRPM | DIASTOLIC BLOOD PRESSURE: 82 MMHG

## 2024-04-03 DIAGNOSIS — E66.9 OBESITY, UNSPECIFIED: ICD-10-CM

## 2024-04-03 DIAGNOSIS — Z01.810 ENCOUNTER FOR PREPROCEDURAL CARDIOVASCULAR EXAMINATION: ICD-10-CM

## 2024-04-03 PROCEDURE — G2211 COMPLEX E/M VISIT ADD ON: CPT

## 2024-04-03 PROCEDURE — 93000 ELECTROCARDIOGRAM COMPLETE: CPT

## 2024-04-03 PROCEDURE — 99214 OFFICE O/P EST MOD 30 MIN: CPT

## 2024-04-03 NOTE — PHYSICAL EXAM
[No Acute Distress] : no acute distress [Normal S1, S2] : normal S1, S2 [Obese] : obese [Clear Lung Fields] : clear lung fields [Normal Gait] : normal gait [No Edema] : no edema [Alert and Oriented] : alert and oriented [de-identified] : Well-healed abdominal surgical scars

## 2024-04-03 NOTE — CARDIOLOGY SUMMARY
[de-identified] : 4/3/2024. Sinus Bradycardia. Voltage criteria for LVH.    [de-identified] : 11/19/19 (nuclear), Normal myocardial perfusion with no clear evidence of infarction or inducible ischemia. Normal left ventricular function (ejection fraction 58%)   [de-identified] : 11/14/19, Endocardium not well visualized; grossly normal left ventricular systolic function. Mild concentric left ventricular hypertrophy. Normal right ventricular size and function. Minimal tricuspid regurgitation. Normal aortic size. LVEF 57%.

## 2024-04-03 NOTE — HISTORY OF PRESENT ILLNESS
[FreeTextEntry1] : Mark Partida is a 58-year-old man with a history of hypertension, hyperlipidemia, prediabetes, obesity, and recently diagnosed colon cancer who returns for cardiac examination--I last saw him in late December 2023 prior to colon surgery.   He remains under the care of an oncologist (Dr. Kapoor) and says he is doing well.  He has no cardiovascular complaints and has been tolerating prescribed pharmacotherapy.  * This visit was conducted as part of ongoing, longitudinal medical care for patient's chronic medical diagnoses and other issues.

## 2024-04-03 NOTE — DISCUSSION/SUMMARY
[With Me] : with me [___ Year(s)] : in [unfilled] year(s) [FreeTextEntry1] :  Hypertension: Controlled; Continue Losartan, hydrochlorothiazide, and metoprolol. I strongly encourage diet, exercise, and weight loss.  Hyperlipidemia: Controlled; continue rosuvastatin; He plans to have a lipid panel rechecked during upcoming visit with his primary care physician.

## 2024-04-03 NOTE — REVIEW OF SYSTEMS
[Dyspnea on exertion] : not dyspnea during exertion [Chest Discomfort] : no chest discomfort [Palpitations] : no palpitations [Under Stress] : under stress

## 2024-04-22 ENCOUNTER — APPOINTMENT (OUTPATIENT)
Dept: INTERNAL MEDICINE | Facility: CLINIC | Age: 59
End: 2024-04-22
Payer: COMMERCIAL

## 2024-04-22 VITALS
RESPIRATION RATE: 16 BRPM | HEIGHT: 69 IN | HEART RATE: 72 BPM | DIASTOLIC BLOOD PRESSURE: 82 MMHG | WEIGHT: 245 LBS | SYSTOLIC BLOOD PRESSURE: 122 MMHG | BODY MASS INDEX: 36.29 KG/M2 | OXYGEN SATURATION: 97 % | TEMPERATURE: 98.2 F

## 2024-04-22 DIAGNOSIS — Z90.49 ACQUIRED ABSENCE OF OTHER SPECIFIED PARTS OF DIGESTIVE TRACT: ICD-10-CM

## 2024-04-22 DIAGNOSIS — R60.0 LOCALIZED EDEMA: ICD-10-CM

## 2024-04-22 DIAGNOSIS — C18.9 MALIGNANT NEOPLASM OF COLON, UNSPECIFIED: ICD-10-CM

## 2024-04-22 DIAGNOSIS — R73.03 PREDIABETES.: ICD-10-CM

## 2024-04-22 DIAGNOSIS — I10 ESSENTIAL (PRIMARY) HYPERTENSION: ICD-10-CM

## 2024-04-22 DIAGNOSIS — E78.5 HYPERLIPIDEMIA, UNSPECIFIED: ICD-10-CM

## 2024-04-22 DIAGNOSIS — Z00.00 ENCOUNTER FOR GENERAL ADULT MEDICAL EXAMINATION W/OUT ABNORMAL FINDINGS: ICD-10-CM

## 2024-04-22 PROCEDURE — 99396 PREV VISIT EST AGE 40-64: CPT

## 2024-04-22 NOTE — PLAN
[FreeTextEntry1] : Mr. Partida presents for an annual physical examination.  1.  He will continue on current medication regimen which has been reviewed and revised. 2.  He does have some mild right lower extremity edema.  Patient will be sent for a right lower extremity ultrasound to rule out DVT since he has a history of malignancy. 3.  Mr. Partida had stage IIa adenocarcinoma of the colon and is status post left hemicolectomy.  He will continue to follow-up with [place] [facility]. 4.  Check CBC, CMP, hemoglobin A1c, iron level, lipid profile, PSA level, TSH and urinalysis 5.  Patient will use nonsteroidal anti-inflammatories for pain in his thigh.  He may have right-sided sciatica.

## 2024-04-22 NOTE — HISTORY OF PRESENT ILLNESS
[FreeTextEntry1] : Annual physical examination [de-identified] : Mr. Partida presents for annual physical examination.  He underwent left hemicolectomy on 1/5/2024 for stage IIa adenocarcinoma of the colon.  He is followed by [facility] [place] [facility].  He did not require chemotherapy. Mr. Partida is complaining of some pain in the posterior part of the right buttock and thigh.  There is no back pain.  Patient also has intermittent episodes of right lower extremity edema.  He denies any calf pain or shortness of breath.  Mr. Partida has had a postoperative colonoscopy.

## 2024-04-22 NOTE — HEALTH RISK ASSESSMENT
[Yes] : Yes [Monthly or less (1 pt)] : Monthly or less (1 point) [1 or 2 (0 pts)] : 1 or 2 (0 points) [Never (0 pts)] : Never (0 points) [No] : In the past 12 months have you used drugs other than those required for medical reasons? No [0] : 2) Feeling down, depressed, or hopeless: Not at all (0) [Patient reported colonoscopy was abnormal] : Patient reported colonoscopy was abnormal [Never] : Never [Very Good] : ~his/her~  mood as very good [ColonoscopyDate] : 10/23

## 2024-04-23 ENCOUNTER — RX RENEWAL (OUTPATIENT)
Age: 59
End: 2024-04-23

## 2024-04-23 RX ORDER — ROSUVASTATIN CALCIUM 5 MG/1
5 TABLET, FILM COATED ORAL
Qty: 90 | Refills: 1 | Status: ACTIVE | COMMUNITY
Start: 2021-10-18 | End: 1900-01-01

## 2024-04-23 RX ORDER — HYDROCHLOROTHIAZIDE 25 MG/1
25 TABLET ORAL
Qty: 90 | Refills: 3 | Status: ACTIVE | COMMUNITY
Start: 2022-03-16 | End: 1900-01-01

## 2024-04-25 LAB
ALBUMIN SERPL ELPH-MCNC: 4.1 G/DL
ALP BLD-CCNC: 62 U/L
ALT SERPL-CCNC: 20 U/L
ANION GAP SERPL CALC-SCNC: 15 MMOL/L
APPEARANCE: CLEAR
AST SERPL-CCNC: 22 U/L
BACTERIA: NEGATIVE /HPF
BILIRUB SERPL-MCNC: 0.5 MG/DL
BILIRUBIN URINE: NEGATIVE
BLOOD URINE: NEGATIVE
BUN SERPL-MCNC: 15 MG/DL
CALCIUM SERPL-MCNC: 9.5 MG/DL
CAST: 0 /LPF
CHLORIDE SERPL-SCNC: 101 MMOL/L
CHOLEST SERPL-MCNC: 131 MG/DL
CO2 SERPL-SCNC: 22 MMOL/L
COLOR: YELLOW
CREAT SERPL-MCNC: 1.01 MG/DL
EGFR: 87 ML/MIN/1.73M2
EPITHELIAL CELLS: 0 /HPF
ESTIMATED AVERAGE GLUCOSE: 131 MG/DL
GLUCOSE QUALITATIVE U: NEGATIVE MG/DL
GLUCOSE SERPL-MCNC: 137 MG/DL
HBA1C MFR BLD HPLC: 6.2 %
HCT VFR BLD CALC: 41.5 %
HDLC SERPL-MCNC: 40 MG/DL
HGB BLD-MCNC: 14.4 G/DL
IRON SERPL-MCNC: 83 UG/DL
KETONES URINE: NEGATIVE MG/DL
LDLC SERPL CALC-MCNC: 64 MG/DL
LEUKOCYTE ESTERASE URINE: NEGATIVE
MCHC RBC-ENTMCNC: 31.1 PG
MCHC RBC-ENTMCNC: 34.7 GM/DL
MCV RBC AUTO: 89.6 FL
MEV IGG FLD QL IA: >300 AU/ML
MEV IGG+IGM SER-IMP: POSITIVE
MICROSCOPIC-UA: NORMAL
MUV AB SER-ACNC: NEGATIVE
MUV IGG SER QL IA: <5 AU/ML
NITRITE URINE: NEGATIVE
NONHDLC SERPL-MCNC: 91 MG/DL
PH URINE: 6.5
PLATELET # BLD AUTO: 193 K/UL
POTASSIUM SERPL-SCNC: 3.9 MMOL/L
PROT SERPL-MCNC: 6.5 G/DL
PROTEIN URINE: NEGATIVE MG/DL
PSA SERPL-MCNC: 2.5 NG/ML
RBC # BLD: 4.63 M/UL
RBC # FLD: 12.7 %
RED BLOOD CELLS URINE: 0 /HPF
RUBV IGG FLD-ACNC: 13.3 INDEX
RUBV IGG SER-IMP: POSITIVE
SODIUM SERPL-SCNC: 138 MMOL/L
SPECIFIC GRAVITY URINE: 1.01
TRIGL SERPL-MCNC: 157 MG/DL
TSH SERPL-ACNC: 1.87 UIU/ML
UROBILINOGEN URINE: 0.2 MG/DL
VZV AB TITR SER: POSITIVE
VZV IGG SER IF-ACNC: 1950 INDEX
WBC # FLD AUTO: 12.16 K/UL
WHITE BLOOD CELLS URINE: 0 /HPF

## 2024-04-26 ENCOUNTER — APPOINTMENT (OUTPATIENT)
Dept: ULTRASOUND IMAGING | Facility: CLINIC | Age: 59
End: 2024-04-26
Payer: COMMERCIAL

## 2024-04-26 ENCOUNTER — RESULT REVIEW (OUTPATIENT)
Age: 59
End: 2024-04-26

## 2024-04-26 PROCEDURE — 93971 EXTREMITY STUDY: CPT | Mod: RT

## 2024-05-08 ENCOUNTER — NON-APPOINTMENT (OUTPATIENT)
Age: 59
End: 2024-05-08

## 2024-05-08 DIAGNOSIS — D72.829 ELEVATED WHITE BLOOD CELL COUNT, UNSPECIFIED: ICD-10-CM

## 2024-05-09 ENCOUNTER — NON-APPOINTMENT (OUTPATIENT)
Age: 59
End: 2024-05-09

## 2024-06-23 ENCOUNTER — RX RENEWAL (OUTPATIENT)
Age: 59
End: 2024-06-23

## 2024-06-23 RX ORDER — LOSARTAN POTASSIUM 100 MG/1
100 TABLET, FILM COATED ORAL
Qty: 90 | Refills: 0 | Status: ACTIVE | COMMUNITY
Start: 2019-12-18 | End: 1900-01-01

## 2024-08-21 ENCOUNTER — RX RENEWAL (OUTPATIENT)
Age: 59
End: 2024-08-21

## 2024-09-17 ENCOUNTER — RX RENEWAL (OUTPATIENT)
Age: 59
End: 2024-09-17

## 2024-10-22 ENCOUNTER — APPOINTMENT (OUTPATIENT)
Dept: INTERNAL MEDICINE | Facility: CLINIC | Age: 59
End: 2024-10-22
Payer: COMMERCIAL

## 2024-10-22 DIAGNOSIS — R59.9 ENLARGED LYMPH NODES, UNSPECIFIED: ICD-10-CM

## 2024-10-22 DIAGNOSIS — Z90.49 ACQUIRED ABSENCE OF OTHER SPECIFIED PARTS OF DIGESTIVE TRACT: ICD-10-CM

## 2024-10-22 DIAGNOSIS — Z01.818 ENCOUNTER FOR OTHER PREPROCEDURAL EXAMINATION: ICD-10-CM

## 2024-10-22 DIAGNOSIS — C18.9 MALIGNANT NEOPLASM OF COLON, UNSPECIFIED: ICD-10-CM

## 2024-10-22 PROCEDURE — G2211 COMPLEX E/M VISIT ADD ON: CPT | Mod: NC

## 2024-10-22 PROCEDURE — 99215 OFFICE O/P EST HI 40 MIN: CPT

## 2024-10-24 ENCOUNTER — APPOINTMENT (OUTPATIENT)
Dept: CARDIOLOGY | Facility: CLINIC | Age: 59
End: 2024-10-24
Payer: COMMERCIAL

## 2024-10-24 VITALS
OXYGEN SATURATION: 96 % | SYSTOLIC BLOOD PRESSURE: 118 MMHG | HEIGHT: 69 IN | BODY MASS INDEX: 36.73 KG/M2 | DIASTOLIC BLOOD PRESSURE: 70 MMHG | HEART RATE: 59 BPM | WEIGHT: 248 LBS

## 2024-10-24 DIAGNOSIS — Z01.810 ENCOUNTER FOR PREPROCEDURAL CARDIOVASCULAR EXAMINATION: ICD-10-CM

## 2024-10-24 DIAGNOSIS — E78.5 HYPERLIPIDEMIA, UNSPECIFIED: ICD-10-CM

## 2024-10-24 DIAGNOSIS — I10 ESSENTIAL (PRIMARY) HYPERTENSION: ICD-10-CM

## 2024-10-24 PROCEDURE — G2211 COMPLEX E/M VISIT ADD ON: CPT | Mod: NC

## 2024-10-24 PROCEDURE — 99214 OFFICE O/P EST MOD 30 MIN: CPT

## 2024-10-24 PROCEDURE — 93000 ELECTROCARDIOGRAM COMPLETE: CPT

## 2025-02-24 ENCOUNTER — APPOINTMENT (OUTPATIENT)
Dept: INTERNAL MEDICINE | Facility: CLINIC | Age: 60
End: 2025-02-24
Payer: COMMERCIAL

## 2025-02-27 ENCOUNTER — APPOINTMENT (OUTPATIENT)
Dept: INTERNAL MEDICINE | Facility: CLINIC | Age: 60
End: 2025-02-27
Payer: COMMERCIAL

## 2025-02-27 VITALS
TEMPERATURE: 97.9 F | WEIGHT: 250 LBS | BODY MASS INDEX: 37.03 KG/M2 | HEIGHT: 69 IN | OXYGEN SATURATION: 95 % | HEART RATE: 61 BPM | DIASTOLIC BLOOD PRESSURE: 82 MMHG | SYSTOLIC BLOOD PRESSURE: 130 MMHG

## 2025-02-27 DIAGNOSIS — Z01.810 ENCOUNTER FOR PREPROCEDURAL CARDIOVASCULAR EXAMINATION: ICD-10-CM

## 2025-02-27 DIAGNOSIS — Z90.49 ACQUIRED ABSENCE OF OTHER SPECIFIED PARTS OF DIGESTIVE TRACT: ICD-10-CM

## 2025-02-27 DIAGNOSIS — R60.0 LOCALIZED EDEMA: ICD-10-CM

## 2025-02-27 DIAGNOSIS — K51.90 ULCERATIVE COLITIS, UNSPECIFIED, W/OUT COMPLICATIONS: ICD-10-CM

## 2025-02-27 DIAGNOSIS — E78.5 HYPERLIPIDEMIA, UNSPECIFIED: ICD-10-CM

## 2025-02-27 DIAGNOSIS — D72.829 ELEVATED WHITE BLOOD CELL COUNT, UNSPECIFIED: ICD-10-CM

## 2025-02-27 DIAGNOSIS — I10 ESSENTIAL (PRIMARY) HYPERTENSION: ICD-10-CM

## 2025-02-27 DIAGNOSIS — L98.9 DISORDER OF THE SKIN AND SUBCUTANEOUS TISSUE, UNSPECIFIED: ICD-10-CM

## 2025-02-27 DIAGNOSIS — R73.03 PREDIABETES.: ICD-10-CM

## 2025-02-27 DIAGNOSIS — R22.1 LOCALIZED SWELLING, MASS AND LUMP, NECK: ICD-10-CM

## 2025-02-27 DIAGNOSIS — C18.9 MALIGNANT NEOPLASM OF COLON, UNSPECIFIED: ICD-10-CM

## 2025-02-27 PROCEDURE — G2211 COMPLEX E/M VISIT ADD ON: CPT | Mod: NC

## 2025-02-27 PROCEDURE — 99214 OFFICE O/P EST MOD 30 MIN: CPT

## 2025-02-27 RX ORDER — MESALAMINE 1000 MG/1
SUPPOSITORY RECTAL
Refills: 0 | Status: ACTIVE | COMMUNITY

## 2025-03-09 LAB
25(OH)D3 SERPL-MCNC: 29 NG/ML
ALBUMIN SERPL ELPH-MCNC: 4.2 G/DL
ALP BLD-CCNC: 64 U/L
ALT SERPL-CCNC: 18 U/L
ANION GAP SERPL CALC-SCNC: 15 MMOL/L
APPEARANCE: CLEAR
AST SERPL-CCNC: 21 U/L
BACTERIA: NEGATIVE /HPF
BILIRUB SERPL-MCNC: 0.6 MG/DL
BILIRUBIN URINE: NEGATIVE
BLOOD URINE: NEGATIVE
BUN SERPL-MCNC: 14 MG/DL
CALCIUM SERPL-MCNC: 9.6 MG/DL
CAST: 1 /LPF
CHLORIDE SERPL-SCNC: 101 MMOL/L
CHOLEST SERPL-MCNC: 144 MG/DL
CO2 SERPL-SCNC: 27 MMOL/L
COLOR: YELLOW
CREAT SERPL-MCNC: 1.08 MG/DL
EGFR: 80 ML/MIN/1.73M2
EPITHELIAL CELLS: 0 /HPF
ESTIMATED AVERAGE GLUCOSE: 137 MG/DL
GLUCOSE QUALITATIVE U: NEGATIVE MG/DL
GLUCOSE SERPL-MCNC: 83 MG/DL
HBA1C MFR BLD HPLC: 6.4 %
HCT VFR BLD CALC: 44.3 %
HDLC SERPL-MCNC: 39 MG/DL
HGB BLD-MCNC: 15.3 G/DL
IRON SERPL-MCNC: 121 UG/DL
KETONES URINE: ABNORMAL MG/DL
LDLC SERPL CALC-MCNC: 85 MG/DL
LEUKOCYTE ESTERASE URINE: NEGATIVE
MCHC RBC-ENTMCNC: 30.1 PG
MCHC RBC-ENTMCNC: 34.5 G/DL
MCV RBC AUTO: 87.2 FL
MICROSCOPIC-UA: NORMAL
NITRITE URINE: NEGATIVE
NONHDLC SERPL-MCNC: 105 MG/DL
PH URINE: 5.5
PLATELET # BLD AUTO: 182 K/UL
POTASSIUM SERPL-SCNC: 3.8 MMOL/L
PROT SERPL-MCNC: 6.8 G/DL
PROTEIN URINE: NEGATIVE MG/DL
PSA SERPL-MCNC: 2.67 NG/ML
RBC # BLD: 5.08 M/UL
RBC # FLD: 12.9 %
RED BLOOD CELLS URINE: 2 /HPF
SODIUM SERPL-SCNC: 142 MMOL/L
SPECIFIC GRAVITY URINE: 1.02
TRIGL SERPL-MCNC: 110 MG/DL
TSH SERPL-ACNC: 1.8 UIU/ML
UROBILINOGEN URINE: 0.2 MG/DL
WBC # FLD AUTO: 9.21 K/UL
WHITE BLOOD CELLS URINE: 0 /HPF

## 2025-04-02 ENCOUNTER — NON-APPOINTMENT (OUTPATIENT)
Age: 60
End: 2025-04-02

## 2025-04-02 ENCOUNTER — APPOINTMENT (OUTPATIENT)
Dept: CARDIOLOGY | Facility: CLINIC | Age: 60
End: 2025-04-02
Payer: COMMERCIAL

## 2025-04-02 VITALS
HEIGHT: 69 IN | DIASTOLIC BLOOD PRESSURE: 76 MMHG | SYSTOLIC BLOOD PRESSURE: 126 MMHG | WEIGHT: 252 LBS | HEART RATE: 63 BPM | OXYGEN SATURATION: 96 % | BODY MASS INDEX: 37.33 KG/M2

## 2025-04-02 DIAGNOSIS — E66.9 OBESITY, UNSPECIFIED: ICD-10-CM

## 2025-04-02 DIAGNOSIS — I10 ESSENTIAL (PRIMARY) HYPERTENSION: ICD-10-CM

## 2025-04-02 DIAGNOSIS — E78.5 HYPERLIPIDEMIA, UNSPECIFIED: ICD-10-CM

## 2025-04-02 PROCEDURE — G2211 COMPLEX E/M VISIT ADD ON: CPT | Mod: NC

## 2025-04-02 PROCEDURE — 99214 OFFICE O/P EST MOD 30 MIN: CPT

## 2025-04-02 PROCEDURE — 93000 ELECTROCARDIOGRAM COMPLETE: CPT

## 2025-07-01 ENCOUNTER — NON-APPOINTMENT (OUTPATIENT)
Age: 60
End: 2025-07-01

## 2025-07-01 ENCOUNTER — APPOINTMENT (OUTPATIENT)
Dept: DERMATOLOGY | Facility: CLINIC | Age: 60
End: 2025-07-01
Payer: COMMERCIAL

## 2025-07-01 PROBLEM — L57.0 ACTINIC KERATOSIS: Status: ACTIVE | Noted: 2025-07-01

## 2025-07-01 PROBLEM — L81.4 LENTIGINES: Status: ACTIVE | Noted: 2025-07-01

## 2025-07-01 PROCEDURE — 17000 DESTRUCT PREMALG LESION: CPT

## 2025-07-01 PROCEDURE — 99203 OFFICE O/P NEW LOW 30 MIN: CPT | Mod: 25
